# Patient Record
Sex: MALE | Race: WHITE | Employment: FULL TIME | URBAN - METROPOLITAN AREA
[De-identification: names, ages, dates, MRNs, and addresses within clinical notes are randomized per-mention and may not be internally consistent; named-entity substitution may affect disease eponyms.]

---

## 2017-01-17 ENCOUNTER — OFFICE VISIT (OUTPATIENT)
Dept: FAMILY MEDICINE CLINIC | Facility: CLINIC | Age: 52
End: 2017-01-17

## 2017-01-17 VITALS
DIASTOLIC BLOOD PRESSURE: 86 MMHG | TEMPERATURE: 98 F | HEART RATE: 69 BPM | WEIGHT: 171 LBS | BODY MASS INDEX: 26 KG/M2 | SYSTOLIC BLOOD PRESSURE: 133 MMHG

## 2017-01-17 DIAGNOSIS — J06.9 ACUTE URI: Primary | ICD-10-CM

## 2017-01-17 PROCEDURE — 99213 OFFICE O/P EST LOW 20 MIN: CPT | Performed by: FAMILY MEDICINE

## 2017-01-17 PROCEDURE — 99212 OFFICE O/P EST SF 10 MIN: CPT | Performed by: FAMILY MEDICINE

## 2017-01-17 RX ORDER — AZITHROMYCIN 250 MG/1
TABLET, FILM COATED ORAL
Qty: 6 TABLET | Refills: 0 | Status: SHIPPED | OUTPATIENT
Start: 2017-01-17 | End: 2017-01-17

## 2017-01-17 RX ORDER — AZITHROMYCIN 250 MG/1
TABLET, FILM COATED ORAL
Qty: 6 TABLET | Refills: 0 | Status: SHIPPED | OUTPATIENT
Start: 2017-01-17 | End: 2017-01-24

## 2017-01-18 NOTE — PROGRESS NOTES
HPI:    Patient ID: Rimma Khanna is a 49year old male. HPI  Patient presents with:  Cough: c/o cought and congestion    Review of Systems   Constitutional: Positive for fatigue. Negative for fever. HENT: Positive for congestion.     Respiratory: Posit

## 2017-01-24 ENCOUNTER — OFFICE VISIT (OUTPATIENT)
Dept: FAMILY MEDICINE CLINIC | Facility: CLINIC | Age: 52
End: 2017-01-24

## 2017-01-24 VITALS
DIASTOLIC BLOOD PRESSURE: 81 MMHG | TEMPERATURE: 98 F | HEART RATE: 60 BPM | SYSTOLIC BLOOD PRESSURE: 120 MMHG | WEIGHT: 171 LBS | BODY MASS INDEX: 26 KG/M2

## 2017-01-24 DIAGNOSIS — J18.9 ATYPICAL PNEUMONIA: Primary | ICD-10-CM

## 2017-01-24 PROCEDURE — 99212 OFFICE O/P EST SF 10 MIN: CPT | Performed by: FAMILY MEDICINE

## 2017-01-24 PROCEDURE — 99213 OFFICE O/P EST LOW 20 MIN: CPT | Performed by: FAMILY MEDICINE

## 2017-01-25 NOTE — PROGRESS NOTES
HPI:    Patient ID: Carol Otoole is a 49year old male. HPI  Patient presents with:  Pneumonia: f/u  feels much better. Review of Systems   Constitutional: Negative. HENT: Negative. Respiratory: Positive for cough.  Negative for shortness of anthony

## 2017-12-15 ENCOUNTER — APPOINTMENT (OUTPATIENT)
Dept: GENERAL RADIOLOGY | Facility: HOSPITAL | Age: 52
End: 2017-12-15
Attending: EMERGENCY MEDICINE
Payer: COMMERCIAL

## 2017-12-15 ENCOUNTER — HOSPITAL ENCOUNTER (EMERGENCY)
Facility: HOSPITAL | Age: 52
Discharge: HOME | End: 2017-12-15
Attending: EMERGENCY MEDICINE
Payer: COMMERCIAL

## 2017-12-15 VITALS
HEIGHT: 68 IN | BODY MASS INDEX: 27.28 KG/M2 | RESPIRATION RATE: 18 BRPM | SYSTOLIC BLOOD PRESSURE: 136 MMHG | HEART RATE: 62 BPM | OXYGEN SATURATION: 99 % | TEMPERATURE: 97 F | DIASTOLIC BLOOD PRESSURE: 89 MMHG | WEIGHT: 180 LBS

## 2017-12-15 DIAGNOSIS — R07.89 CHEST PAIN, ATYPICAL: Primary | ICD-10-CM

## 2017-12-15 PROCEDURE — 85025 COMPLETE CBC W/AUTO DIFF WBC: CPT | Performed by: EMERGENCY MEDICINE

## 2017-12-15 PROCEDURE — 93005 ELECTROCARDIOGRAM TRACING: CPT

## 2017-12-15 PROCEDURE — 83735 ASSAY OF MAGNESIUM: CPT | Performed by: EMERGENCY MEDICINE

## 2017-12-15 PROCEDURE — 36415 COLL VENOUS BLD VENIPUNCTURE: CPT

## 2017-12-15 PROCEDURE — 80048 BASIC METABOLIC PNL TOTAL CA: CPT | Performed by: EMERGENCY MEDICINE

## 2017-12-15 PROCEDURE — 99285 EMERGENCY DEPT VISIT HI MDM: CPT

## 2017-12-15 PROCEDURE — 93010 ELECTROCARDIOGRAM REPORT: CPT | Performed by: EMERGENCY MEDICINE

## 2017-12-15 PROCEDURE — 84484 ASSAY OF TROPONIN QUANT: CPT | Performed by: EMERGENCY MEDICINE

## 2017-12-15 PROCEDURE — 71020 XR CHEST PA + LAT CHEST (CPT=71020): CPT | Performed by: EMERGENCY MEDICINE

## 2017-12-15 PROCEDURE — 85379 FIBRIN DEGRADATION QUANT: CPT | Performed by: EMERGENCY MEDICINE

## 2017-12-16 NOTE — ED PROVIDER NOTES
Patient Seen in: [facility] AND [facility] Emergency Department    History   Patient presents with:  Chest Pain Angina (cardiovascular)    Stated Complaint: chest pressure     HPI    48 yo M without PMH presenting with two days of anterior chest \"achiness\" witho are as noted in HPI. Constitutional and vital signs reviewed. All other systems reviewed and negative except as noted above. PSFH elements reviewed from today and agreed except as otherwise stated in HPI.     Physical Exam   ED Triage Vitals [12/15 (kpm=51869)    Result Date: 12/15/2017  PROCEDURE: XR CHEST PA + LAT CHEST (CPT=71020)  COMPAR[facility]: [facility]. [facility], X CHEST PA LAT ROUTINE, 5/07/2009, 16:08. INDICATIONS: Atypical chest pain. TECHNIQUE:   Two views.    FINDINGS: CARDI scheduling of outpatient stress test.      Medications Prescribed:  There are no discharge medications for this patient.

## 2017-12-16 NOTE — ED NOTES
Patient complaining of substernal chest pain that radiates up to his neck, and around to his back for the last two days. States nothing makes it feel better or worse, but has felt an ache in both lower arms starting today.  Denies any SOB

## 2017-12-18 ENCOUNTER — OFFICE VISIT (OUTPATIENT)
Dept: FAMILY MEDICINE CLINIC | Facility: CLINIC | Age: 52
End: 2017-12-18

## 2017-12-18 VITALS
DIASTOLIC BLOOD PRESSURE: 85 MMHG | HEART RATE: 67 BPM | HEIGHT: 68 IN | BODY MASS INDEX: 28.04 KG/M2 | WEIGHT: 185 LBS | SYSTOLIC BLOOD PRESSURE: 126 MMHG

## 2017-12-18 DIAGNOSIS — R07.89 OTHER CHEST PAIN: Primary | ICD-10-CM

## 2017-12-18 PROCEDURE — 99212 OFFICE O/P EST SF 10 MIN: CPT | Performed by: FAMILY MEDICINE

## 2017-12-18 PROCEDURE — 99214 OFFICE O/P EST MOD 30 MIN: CPT | Performed by: FAMILY MEDICINE

## 2017-12-18 NOTE — PROGRESS NOTES
HPI:    Patient ID: Paul Crowe is a 47year old male. HPI  Patient presents with:  ER F/U  Chest Pain: still feels some pressure in chest.   Chest pain work up negative. Review of Systems   Constitutional: Negative. Respiratory: Negative.     Ca

## 2019-04-01 ENCOUNTER — OFFICE VISIT (OUTPATIENT)
Dept: FAMILY MEDICINE CLINIC | Facility: CLINIC | Age: 54
End: 2019-04-01
Payer: COMMERCIAL

## 2019-04-01 VITALS
BODY MASS INDEX: 27.29 KG/M2 | TEMPERATURE: 98 F | SYSTOLIC BLOOD PRESSURE: 130 MMHG | WEIGHT: 178 LBS | HEIGHT: 67.6 IN | DIASTOLIC BLOOD PRESSURE: 88 MMHG | HEART RATE: 66 BPM

## 2019-04-01 DIAGNOSIS — K40.90 LEFT INGUINAL HERNIA: ICD-10-CM

## 2019-04-01 DIAGNOSIS — Z00.00 ROUTINE GENERAL MEDICAL EXAMINATION AT A HEALTH CARE FACILITY: Primary | ICD-10-CM

## 2019-04-01 DIAGNOSIS — Z85.89 HISTORY OF SQUAMOUS CELL CARCINOMA: ICD-10-CM

## 2019-04-01 DIAGNOSIS — R19.4 BOWEL HABIT CHANGES: ICD-10-CM

## 2019-04-01 PROCEDURE — 99396 PREV VISIT EST AGE 40-64: CPT | Performed by: FAMILY MEDICINE

## 2019-04-01 NOTE — PROGRESS NOTES
HPI:    Patient ID: Luna Torres is a 47year old male.     HPI  Patient presents with:  Routine Physical  Gastro-esophageal Reflux    Past Medical History:   Diagnosis Date   • Chalazion of right eye 2011    per [employer]: RUL and RLL / OD 2011   • Squamous Breath sounds normal.   Abdominal: Normal appearance. There is no hepatosplenomegaly. There is no tenderness. There is no CVA tenderness. A hernia is present. Hernia confirmed positive in the left inguinal area.    Genitourinary: Prostate normal.   Lymphade

## 2019-05-03 ENCOUNTER — LAB ENCOUNTER (OUTPATIENT)
Dept: LAB | Age: 54
End: 2019-05-03
Attending: FAMILY MEDICINE
Payer: COMMERCIAL

## 2019-05-03 DIAGNOSIS — Z00.00 ROUTINE GENERAL MEDICAL EXAMINATION AT A HEALTH CARE FACILITY: ICD-10-CM

## 2019-05-03 PROCEDURE — 85025 COMPLETE CBC W/AUTO DIFF WBC: CPT

## 2019-05-03 PROCEDURE — 81003 URINALYSIS AUTO W/O SCOPE: CPT

## 2019-05-03 PROCEDURE — 80053 COMPREHEN METABOLIC PANEL: CPT

## 2019-05-03 PROCEDURE — 36415 COLL VENOUS BLD VENIPUNCTURE: CPT

## 2019-05-03 PROCEDURE — 80061 LIPID PANEL: CPT

## 2019-08-14 ENCOUNTER — OFFICE VISIT (OUTPATIENT)
Dept: SURGERY | Facility: CLINIC | Age: 54
End: 2019-08-14
Payer: COMMERCIAL

## 2019-08-14 VITALS — BODY MASS INDEX: 27.28 KG/M2 | HEIGHT: 68 IN | WEIGHT: 180 LBS

## 2019-08-14 DIAGNOSIS — K40.90 NON-RECURRENT UNILATERAL INGUINAL HERNIA WITHOUT OBSTRUCTION OR GANGRENE: Primary | ICD-10-CM

## 2019-08-14 PROCEDURE — 99244 OFF/OP CNSLTJ NEW/EST MOD 40: CPT | Performed by: SURGERY

## 2019-08-15 NOTE — H&P
History and Physical      Alex Galarza is a 49year old male. HPI   Patient presents with:  Hernia: Pt was referred by Dr. Mindy Rebolledo for left inguinal hernia. Pt states has had this for a while, discomfort is getting more frequent.  Has discomfort after noted  Head/Face: normocephalic  Nose/Mouth/Throat: nose and throat are clear palate is intact mucous membranes are moist no oral lesions are noted  Neck/Thyroid: neck is supple without adenopathy  Respiratory: normal to inspection lungs are clear to auscu

## 2019-08-20 ENCOUNTER — NURSE ONLY (OUTPATIENT)
Dept: GASTROENTEROLOGY | Facility: CLINIC | Age: 54
End: 2019-08-20

## 2019-08-20 NOTE — PROGRESS NOTES
Pt states he has had irregular bowel habits for the past year. Vacillates between constipation and then watery diarrhea.  Appt made for consulation    Date, time and location verified    Future Appointments   Date Time Provider Socorro Guzmán   9/11/2019

## 2019-08-22 ENCOUNTER — NURSE TRIAGE (OUTPATIENT)
Dept: FAMILY MEDICINE CLINIC | Facility: CLINIC | Age: 54
End: 2019-08-22

## 2019-08-22 ENCOUNTER — TELEPHONE (OUTPATIENT)
Dept: OPHTHALMOLOGY | Facility: CLINIC | Age: 54
End: 2019-08-22

## 2019-08-22 NOTE — TELEPHONE ENCOUNTER
Action Requested: Summary for Provider     []  Critical Lab, Recommendations Needed  [] Need Additional Advice  []   FYI    []   Need Orders  [] Need Medications Sent to Pharmacy  []  Other     SUMMARY: Transferred to [facility] to [facility] x 35614 per Triage Pro

## 2019-08-27 ENCOUNTER — OFFICE VISIT (OUTPATIENT)
Dept: OPHTHALMOLOGY | Facility: CLINIC | Age: 54
End: 2019-08-27
Payer: COMMERCIAL

## 2019-08-27 DIAGNOSIS — H52.13 MYOPIA OF BOTH EYES WITH ASTIGMATISM AND PRESBYOPIA: ICD-10-CM

## 2019-08-27 DIAGNOSIS — H52.4 MYOPIA OF BOTH EYES WITH ASTIGMATISM AND PRESBYOPIA: ICD-10-CM

## 2019-08-27 DIAGNOSIS — H00.11 CHALAZION RIGHT UPPER EYELID: Primary | ICD-10-CM

## 2019-08-27 DIAGNOSIS — H52.203 MYOPIA OF BOTH EYES WITH ASTIGMATISM AND PRESBYOPIA: ICD-10-CM

## 2019-08-27 PROCEDURE — 92015 DETERMINE REFRACTIVE STATE: CPT | Performed by: OPHTHALMOLOGY

## 2019-08-27 PROCEDURE — 99203 OFFICE O/P NEW LOW 30 MIN: CPT | Performed by: OPHTHALMOLOGY

## 2019-08-27 NOTE — ASSESSMENT & PLAN NOTE
Recommend aggressive warm compresses; patient should use them 20-30 times per day. Information on chalazia given. Told patient that it is not big enough to excise at this time.   Discussed with patient that since is still in the early stages, it may be a

## 2019-08-27 NOTE — PATIENT INSTRUCTIONS
Chalazion right upper eyelid  Recommend aggressive warm compresses; patient should use them 20-30 times per day. Information on chalazia given. Told patient that it is not big enough to excise at this time.   Discussed with patient that since is still in moist towel or compress for 10 to 15 minutes, 3 to 4 times a day. This will reduce the swelling and soften the hardened oils blocking the duct. · Massage the area gently after applying the warm compress to help drain the chalazion.  Or follow your healthca instructions. Chalazion    A chalazion is a blocked, swollen oil gland in the eyelid. The eyelids have oil glands that lubricate the inside of the lids. If a gland becomes blocked, the oil builds up and causes the skin to swell.   A chalazion can shoaib or a moist eyelid cleansing wipe. This is to help reduce clogging of the duct, as well as help prevent a chalazion from returning. Ask your healthcare provider about products to clean your eyelids.   Follow-up care  Follow up with your healthcare provider,

## 2019-08-27 NOTE — PROGRESS NOTES
Andrew Tomlinson is a 49year old male. HPI:     HPI     NP/ pt complains of itching in his right eye x 6 days. Pt states that when he woke up on 8/22/19 his right upper lid was swollen.  Pt called on 8/22/19 and tech advised pt to use warm compresses and to Acuity (Snellen - Linear)       Right Left    Dist cc 20/20 -2 20/20    Near cc 20/20 20/20    Correction:  Glasses          Pupils       Pupils    Right PERRL    Left PERRL            Slit Lamp and Fundus Exam     Slit Lamp Exam       Right Left    Lids/L this encounter        Follow up instructions:  Return in about 7 weeks (around 10/12/2019) for Complete eye exam.    8/27/2019  Scribed by: Octavio Marti MD

## 2019-09-20 ENCOUNTER — TELEPHONE (OUTPATIENT)
Dept: SURGERY | Facility: CLINIC | Age: 54
End: 2019-09-20

## 2019-09-20 NOTE — TELEPHONE ENCOUNTER
Pt states he received a call from the sx center and was told to call the office to give an update on his condition from sx yesterday. Pt state he is doing fine overall.

## 2019-09-23 ENCOUNTER — TELEPHONE (OUTPATIENT)
Dept: SURGERY | Facility: CLINIC | Age: 54
End: 2019-09-23

## 2019-09-23 NOTE — TELEPHONE ENCOUNTER
Pt called stating pt had surgery 9-19-19. Pt requesting a return to work note. Returning on weds. 9-25-19. Post note on [employer]. Call to advise            Called pt back @ 11:50 today.   I have discussed his case again with medical and radiation oncolog

## 2019-10-12 ENCOUNTER — TELEPHONE (OUTPATIENT)
Dept: OPHTHALMOLOGY | Facility: CLINIC | Age: 54
End: 2019-10-12

## 2019-10-15 ENCOUNTER — OFFICE VISIT (OUTPATIENT)
Dept: GASTROENTEROLOGY | Facility: CLINIC | Age: 54
End: 2019-10-15
Payer: COMMERCIAL

## 2019-10-15 ENCOUNTER — TELEPHONE (OUTPATIENT)
Dept: GASTROENTEROLOGY | Facility: CLINIC | Age: 54
End: 2019-10-15

## 2019-10-15 VITALS
HEART RATE: 73 BPM | SYSTOLIC BLOOD PRESSURE: 123 MMHG | BODY MASS INDEX: 28.08 KG/M2 | DIASTOLIC BLOOD PRESSURE: 77 MMHG | WEIGHT: 181 LBS | HEIGHT: 67.5 IN

## 2019-10-15 DIAGNOSIS — R19.4 ALTERED BOWEL HABITS: ICD-10-CM

## 2019-10-15 DIAGNOSIS — Z12.11 ENCOUNTER FOR SCREENING COLONOSCOPY: Primary | ICD-10-CM

## 2019-10-15 PROCEDURE — 99244 OFF/OP CNSLTJ NEW/EST MOD 40: CPT | Performed by: PHYSICIAN ASSISTANT

## 2019-10-15 RX ORDER — POLYETHYLENE GLYCOL 3350, SODIUM CHLORIDE, SODIUM BICARBONATE, POTASSIUM CHLORIDE 420; 11.2; 5.72; 1.48 G/4L; G/4L; G/4L; G/4L
POWDER, FOR SOLUTION ORAL
Qty: 1 BOTTLE | Refills: 0 | Status: SHIPPED | OUTPATIENT
Start: 2019-10-15 | End: 2019-11-25 | Stop reason: ALTCHOICE

## 2019-10-15 RX ORDER — ZINC SULFATE 50(220)MG
220 CAPSULE ORAL AS NEEDED
Status: ON HOLD | COMMUNITY
End: 2019-12-13

## 2019-10-15 NOTE — PATIENT INSTRUCTIONS
1. Schedule colonoscopy w/ Dr. Amparo Menjivar or Lakisha Martell with IV or MAC sedation @ [address] [place] [address], [facility] or [place]    2.  bowel prep from pharmacy - I have prescribed Trilyte split dose preparation    3.  Medication Changes:    ** If MAC @ [facility]/[place]:    - HOLD ACE/ARBs

## 2019-10-15 NOTE — H&P
[address] [facility] [address] Gastroenterology                                                                                                  Clinic History and Physical     Pa Occupation: HR for long-term    History, Medications, Allergies, ROS:      Past Medical History:   Diagnosis Date   • Chalazion of right eye 2011    RUL and RLL, OD   • Chalazion right upper eyelid 8/27/2019   • Squamous cell carcinoma of skin 2014    rig comfortable and in no acute discomfort  HEENT: conjunctiva pink, the sclera appears anicteric, OP clear, MMM  CV: regular rate and rhythm  Lung: moves air well; no labored breathing  Abdomen: soft, NTND abdomen with +NABS appreciated, no hepatosplenomegaly continue all medications as prescribed  - see above    Colonoscopy consent: I have discussed the risks, benefits, and alternatives to colonoscopy with the patient [who demonstrated understanding], including but not limited to the risks of delayed diagnosis

## 2019-10-15 NOTE — TELEPHONE ENCOUNTER
Scheduled for:  Colonoscopy 79390  Provider Name: Dr. Francheska Hernandez  Date:  11/14/19  Location:  [facility]  Sedation:  MAC  Time:   1100 (pt is aware that [facility] will call the day before to confirm arrival time)    Prep:  Trilyte  Meds/Allergies Reconciled?:  Physician re

## 2019-11-14 ENCOUNTER — TELEPHONE (OUTPATIENT)
Dept: SURGERY | Age: 54
End: 2019-11-14

## 2019-11-14 ENCOUNTER — LAB REQUISITION (OUTPATIENT)
Dept: LAB | Facility: HOSPITAL | Age: 54
End: 2019-11-14
Payer: COMMERCIAL

## 2019-11-14 DIAGNOSIS — Z01.89 ENCOUNTER FOR OTHER SPECIFIED SPECIAL EXAMINATIONS: ICD-10-CM

## 2019-11-14 PROCEDURE — 88305 TISSUE EXAM BY PATHOLOGIST: CPT | Performed by: INTERNAL MEDICINE

## 2019-11-14 NOTE — TELEPHONE ENCOUNTER
Kirstin Dickerson --  Not sure if you automatically get [address] reports from this new link we have into [facility] from the outpatient surgery center [facility]).     Large likely malignant polyp encountered and removed from the rectosigmoid colon tod

## 2019-11-14 NOTE — TELEPHONE ENCOUNTER
Thank you so much for doing his colonoscopy, CB. This is unfortunate, but it is great if it has been caught early. Please let me know if you need any other assistance with this patient.     Thank you, Dr. Farhan Molina, for continuing to refer patients to see m

## 2019-11-15 ENCOUNTER — TELEPHONE (OUTPATIENT)
Dept: GASTROENTEROLOGY | Facility: CLINIC | Age: 54
End: 2019-11-15

## 2019-11-15 ENCOUNTER — NURSE NAVIGATOR ENCOUNTER (OUTPATIENT)
Dept: HEMATOLOGY/ONCOLOGY | Facility: HOSPITAL | Age: 54
End: 2019-11-15

## 2019-11-15 DIAGNOSIS — C18.7 MALIGNANT NEOPLASM OF SIGMOID COLON (HCC): Primary | ICD-10-CM

## 2019-11-15 NOTE — TELEPHONE ENCOUNTER
I spoke to the pt and I  Advised him the pathology is not back yet and he should check back on Monday.  He verbalizes understanding

## 2019-11-15 NOTE — TELEPHONE ENCOUNTER
Sent call to RN - Patient requesting to speak with clinical staff regarding CLN results. Please call - very anxious. Thank you.

## 2019-11-15 NOTE — TELEPHONE ENCOUNTER
Dr Ashley Ortiz called me with path results on yesterday's large sigmoid polyp. Multiple fragments contain invasive adenocarcinoma with positive margins.   Complexity of yesterday's resection with on and off bleeding made definitive retrieval and labelling of pi

## 2019-11-15 NOTE — PROGRESS NOTES
TONYA introducing myself as GI Navigator. Left direct number. Encouraged to call me prn. Provided main number to the [facility]. I will be in touch with him early next week to coordinate his care given this recent colon cancer diagnosis.

## 2019-11-18 ENCOUNTER — TELEPHONE (OUTPATIENT)
Dept: HEMATOLOGY/ONCOLOGY | Facility: HOSPITAL | Age: 54
End: 2019-11-18

## 2019-11-18 NOTE — TELEPHONE ENCOUNTER
Medical Oncology consult scheduled for Monday, 11/25/19, with Dr. Jae Garcia. Staging CT 11/20/19. Will coordinate consult Dr. Summer Astudillo based on CT scan. Provided patient direct number. Educated on role of GI Navigator.   Encouraged to jesus prn moving forward

## 2019-11-19 ENCOUNTER — TELEPHONE (OUTPATIENT)
Dept: GASTROENTEROLOGY | Facility: CLINIC | Age: 54
End: 2019-11-19

## 2019-11-19 NOTE — TELEPHONE ENCOUNTER
I called Uziel Wilde @ [phone]. CPT codes 61822/34533 do not require a PA.  Confirmation #:[id number]

## 2019-11-19 NOTE — TELEPHONE ENCOUNTER
Hanny,    Is there anyway you can check on this referral for the CT of the abdomen & pelvis.  Pt is scheduled for tomorrow

## 2019-11-20 ENCOUNTER — HOSPITAL ENCOUNTER (OUTPATIENT)
Dept: CT IMAGING | Facility: HOSPITAL | Age: 54
Discharge: HOME | End: 2019-11-20
Attending: INTERNAL MEDICINE
Payer: COMMERCIAL

## 2019-11-20 ENCOUNTER — TELEPHONE (OUTPATIENT)
Dept: HEMATOLOGY/ONCOLOGY | Facility: HOSPITAL | Age: 54
End: 2019-11-20

## 2019-11-20 DIAGNOSIS — C18.7 MALIGNANT NEOPLASM OF SIGMOID COLON (HCC): ICD-10-CM

## 2019-11-20 PROCEDURE — 82565 ASSAY OF CREATININE: CPT

## 2019-11-20 PROCEDURE — 74177 CT ABD & PELVIS W/CONTRAST: CPT | Performed by: INTERNAL MEDICINE

## 2019-11-20 PROCEDURE — 71260 CT THORAX DX C+: CPT | Performed by: INTERNAL MEDICINE

## 2019-11-20 NOTE — TELEPHONE ENCOUNTER
Pt called asking if his CT scheduled for today would have results ready immediately following his scan and who would give them to him.  I explained results would not be immediate; the radiologist who reads the scan would need to dictate his results and jorge

## 2019-11-22 ENCOUNTER — TELEPHONE (OUTPATIENT)
Dept: GASTROENTEROLOGY | Facility: CLINIC | Age: 54
End: 2019-11-22

## 2019-11-22 NOTE — TELEPHONE ENCOUNTER
Patient calling for results from CT completed 11/20/19, patient informed  not in clinic today. Patient has concerns, he was told he would receive a call Thursday or Friday. Please call at:[phone],thanks.   *patient had concerns regarding polys

## 2019-11-22 NOTE — TELEPHONE ENCOUNTER
OFFICE/CLINIC ON-CALL:    Dr. Shashi Ayala out of town so I  discussed CT results with patient and his brother Timothy Luo. New dx of colon cancer. They understand no lesions seen in liver.  Lung lesions are multiple, may be granulomas OR may be metastatic disease which

## 2019-11-25 ENCOUNTER — TELEPHONE (OUTPATIENT)
Dept: HEMATOLOGY/ONCOLOGY | Facility: HOSPITAL | Age: 54
End: 2019-11-25

## 2019-11-25 ENCOUNTER — TELEPHONE (OUTPATIENT)
Dept: SURGERY | Facility: CLINIC | Age: 54
End: 2019-11-25

## 2019-11-25 ENCOUNTER — OFFICE VISIT (OUTPATIENT)
Dept: HEMATOLOGY/ONCOLOGY | Facility: HOSPITAL | Age: 54
End: 2019-11-25
Attending: INTERNAL MEDICINE
Payer: COMMERCIAL

## 2019-11-25 VITALS
HEART RATE: 70 BPM | HEIGHT: 67.5 IN | TEMPERATURE: 99 F | BODY MASS INDEX: 28.08 KG/M2 | WEIGHT: 181 LBS | SYSTOLIC BLOOD PRESSURE: 130 MMHG | OXYGEN SATURATION: 97 % | DIASTOLIC BLOOD PRESSURE: 88 MMHG

## 2019-11-25 DIAGNOSIS — C18.7 MALIGNANT NEOPLASM OF SIGMOID COLON (HCC): Primary | ICD-10-CM

## 2019-11-25 DIAGNOSIS — C18.7 CANCER OF SIGMOID COLON (HCC): Primary | ICD-10-CM

## 2019-11-25 PROCEDURE — 99245 OFF/OP CONSLTJ NEW/EST HI 55: CPT | Performed by: INTERNAL MEDICINE

## 2019-11-25 NOTE — PROGRESS NOTES
HPI     Alex Galarza is a 49year old male who is here today for evaluation of newly diagnosed Malignant neoplasm of sigmoid colon (hcc)  (primary encounter diagnosis). Patient was diagnosed on November 14, 2019.     Initial symptoms:    Altered bowel dizziness and headaches. Hematological: Negative for adenopathy. Does not bruise/bleed easily. Psychiatric/Behavioral: Negative for sleep disturbance.          Current Outpatient Medications   Medication Sig Dispense Refill   • zinc sulfate (ZINC-220) 2 Intimate partner violence:        Fear of current or ex partner: Not on file        Emotionally abused: Not on file        Physically abused: Not on file        Forced sexual activity: Not on file    Other Topics      Concerns:         Service: No ASSESSMENT/PLAN:   Malignant neoplasm of sigmoid colon (hcc)  (primary encounter diagnosis)    Cancer Staging  Malignant neoplasm of sigmoid colon ([facility] [address]  Staging form: Colon and Rectum, AJCC 8th Edition  - Clinical: Stage Unknown (cTX, cN0, cM0) - Signed surgical resection with Dr. Kym Naylor, and then to have follow-up with me 1 week postop to discuss the patient's pathologic staging, as well as whether there is benefit from adjuvant chemotherapy, and if there is benefit we will discuss adjuvant therapy at th there was still a remaining piece of polypoid tissue with an oozing cut edge, we trimmed away the rest also down the wall of the sigmoid colon using snare cautery. · After the 2 clips had been placed, there was no further bleeding.   It was difficult to as granulomas. No pleural effusion or pneumothorax. VASCULATURE:            No central pulmonary embolus. No pulmonary artery enlargement. No aortic aneurysm or acute aortic injury. CHEST WALL:  No axillary or clavicular lymphadenopathy.     BONES: wall thickening, lesion, or calculus. PELVIC NODES:            No adenopathy. PELVIC ORGANS:         No visible mass. Pelvic organs appropriate for patient age.        Abdomen and pelvis:  BONES:             Mild degenerative endplate change within t Case: [id number]                                   Authorizing Provider:  Cheryle Queen,  Collected:           11/14/2019 09:50 AM                                  MD Pepper Barreto Granulocyte Absolute      0.00 - 1.00 x10(3) uL 0.01   Neutrophils %      % 67.0   Lymphocytes %      % 19.1   Monocytes %      % 9.3   Eosinophils %      % 3.3   Basophils %      % 1.1   Immature Granulocyte %      % 0.2   Glucose      70 - 99 mg/dL 86

## 2019-11-25 NOTE — TELEPHONE ENCOUNTER
I called Mr Rut Gruber to check-in. We discussed recent CT results. He is very satisfied with today's visit with Dr. Marcela Mccrary. He knows Dr. Samson Leaks from previous hernia surgery.   He sounds like he is coping well with the news and the need for colon resection surge

## 2019-11-25 NOTE — TELEPHONE ENCOUNTER
Dr. Sybil Rothman time and assistance greatly appreciated. I called Mr. Hernandezter Abhilash today as per other open telephone encounter.

## 2019-11-25 NOTE — TELEPHONE ENCOUNTER
Mynor Mack was calling back to tell you that he got his consult appointment with Dr. Edi Zafar on 11/27/19.

## 2019-11-25 NOTE — TELEPHONE ENCOUNTER
Informed patient that Dr. Andrea Tobar office will be calling to coordinate consult with Dr. Pernell Short. Encouraged to call me by the end of this week if appt is not set up.

## 2019-11-25 NOTE — TELEPHONE ENCOUNTER
Left voicemail that CEA nl. Also, that per GI tumor board recommend MRI to evaluate location as low sigmoid and close to rectum. Will have RN call to schedule the MRI.

## 2019-11-26 ENCOUNTER — TELEPHONE (OUTPATIENT)
Dept: HEMATOLOGY/ONCOLOGY | Facility: HOSPITAL | Age: 54
End: 2019-11-26

## 2019-11-26 NOTE — TELEPHONE ENCOUNTER
Pt called and notified that MRI authorized. Pt will call to schedule. Pt has appointment with Dr. Stacy Gottron tomorrow.

## 2019-11-27 ENCOUNTER — OFFICE VISIT (OUTPATIENT)
Dept: SURGERY | Facility: CLINIC | Age: 54
End: 2019-11-27
Payer: COMMERCIAL

## 2019-11-27 ENCOUNTER — TELEPHONE (OUTPATIENT)
Dept: ADMINISTRATIVE | Age: 54
End: 2019-11-27

## 2019-11-27 VITALS — WEIGHT: 181 LBS | BODY MASS INDEX: 28.08 KG/M2 | HEIGHT: 67.5 IN

## 2019-11-27 DIAGNOSIS — C18.7 MALIGNANT NEOPLASM OF SIGMOID COLON (HCC): Primary | ICD-10-CM

## 2019-11-27 PROCEDURE — 99214 OFFICE O/P EST MOD 30 MIN: CPT | Performed by: SURGERY

## 2019-11-27 RX ORDER — NEOMYCIN SULFATE 500 MG/1
TABLET ORAL
Qty: 6 TABLET | Refills: 0 | Status: ON HOLD | OUTPATIENT
Start: 2019-11-27 | End: 2019-12-13

## 2019-11-27 RX ORDER — METRONIDAZOLE 500 MG/1
TABLET ORAL
Qty: 6 TABLET | Refills: 0 | Status: ON HOLD | OUTPATIENT
Start: 2019-11-27 | End: 2019-12-13

## 2019-11-27 NOTE — H&P (VIEW-ONLY)
History and Physical      Carlota Lott is a 49year old male. HPI   Patient presents with:  Cancer: Pt referred by Dr. Gabriel Arthur regarding colon cancer. Pt states he was overdue for 1st colonoscopy.   Pt states he noticed changes in bm's (diarrhea/constipat week      Drug use: No    Family History   Problem Relation Age of Onset   • Prostate Cancer Father    • Hypertension Father    • Stroke Father    • Heart Disease Father    • Breast Cancer Mother    • Diabetes Neg    • Glaucoma Neg    • Macular degeneratio cut this polyp out in several large pieces. All cuts today were made using snare cautery current. · One of the final cuts down against the wall of the rectosigmoid colon resulted in rapid arterial bleeding. Photographs taken.   This was controlled by angelique spleen. While the noncalcified pulmonary nodules may represent very early/small metastasis, they more likely represent noncalcified granulomas given the presence of other calcified granulomas.   However, recommend short-term follow-up CT chest in 3 months

## 2019-11-27 NOTE — H&P
History and Physical      Elio Scott is a 49year old male. HPI   Patient presents with:  Cancer: Pt referred by Dr. Rut Coto regarding colon cancer. Pt states he was overdue for 1st colonoscopy.   Pt states he noticed changes in bm's (diarrhea/constipat week      Drug use: No    Family History   Problem Relation Age of Onset   • Prostate Cancer Father    • Hypertension Father    • Stroke Father    • Heart Disease Father    • Breast Cancer Mother    • Diabetes Neg    • Glaucoma Neg    • Macular degeneratio cut this polyp out in several large pieces. All cuts today were made using snare cautery current. · One of the final cuts down against the wall of the rectosigmoid colon resulted in rapid arterial bleeding. Photographs taken.   This was controlled by angelique spleen. While the noncalcified pulmonary nodules may represent very early/small metastasis, they more likely represent noncalcified granulomas given the presence of other calcified granulomas.   However, recommend short-term follow-up CT chest in 3 months

## 2019-11-27 NOTE — H&P (VIEW-ONLY)
History and Physical      Karolina Saenz is a 49year old male. HPI   Patient presents with:  Cancer: Pt referred by Dr. Frankie Purdy regarding colon cancer. Pt states he was overdue for 1st colonoscopy.   Pt states he noticed changes in bm's (diarrhea/constipat week      Drug use: No    Family History   Problem Relation Age of Onset   • Prostate Cancer Father    • Hypertension Father    • Stroke Father    • Heart Disease Father    • Breast Cancer Mother    • Diabetes Neg    • Glaucoma Neg    • Macular degeneratio cut this polyp out in several large pieces. All cuts today were made using snare cautery current. · One of the final cuts down against the wall of the rectosigmoid colon resulted in rapid arterial bleeding. Photographs taken.   This was controlled by angelique spleen. While the noncalcified pulmonary nodules may represent very early/small metastasis, they more likely represent noncalcified granulomas given the presence of other calcified granulomas.   However, recommend short-term follow-up CT chest in 3 months

## 2019-11-27 NOTE — TELEPHONE ENCOUNTER
Pt will be dropping off FMLA or disab forms. He is aware of the HIPAA and fee. 1st day off will be surgery date that will be discussed at today's visit.     When form is completed, he would like them faxed and then attached to his Mychart

## 2019-11-27 NOTE — TELEPHONE ENCOUNTER
Pt dropped off FMLA  Papers JAJA was signed and $25 was paid. Sent to Forms and placed in JAJA @ [facility].

## 2019-11-29 DIAGNOSIS — C18.7 CANCER OF SIGMOID COLON (HCC): Primary | ICD-10-CM

## 2019-12-03 ENCOUNTER — HOSPITAL ENCOUNTER (OUTPATIENT)
Dept: MRI IMAGING | Facility: HOSPITAL | Age: 54
Discharge: HOME | End: 2019-12-03
Attending: INTERNAL MEDICINE
Payer: COMMERCIAL

## 2019-12-03 ENCOUNTER — LAB ENCOUNTER (OUTPATIENT)
Dept: LAB | Facility: HOSPITAL | Age: 54
End: 2019-12-03
Attending: INTERNAL MEDICINE
Payer: COMMERCIAL

## 2019-12-03 DIAGNOSIS — C18.7 CANCER OF SIGMOID COLON (HCC): ICD-10-CM

## 2019-12-03 DIAGNOSIS — Z01.818 PREOPERATIVE TESTING: ICD-10-CM

## 2019-12-03 PROCEDURE — 72197 MRI PELVIS W/O & W/DYE: CPT | Performed by: INTERNAL MEDICINE

## 2019-12-03 PROCEDURE — A9575 INJ GADOTERATE MEGLUMI 0.1ML: HCPCS | Performed by: INTERNAL MEDICINE

## 2019-12-03 PROCEDURE — 86901 BLOOD TYPING SEROLOGIC RH(D): CPT

## 2019-12-03 PROCEDURE — 36415 COLL VENOUS BLD VENIPUNCTURE: CPT

## 2019-12-03 PROCEDURE — 86900 BLOOD TYPING SEROLOGIC ABO: CPT

## 2019-12-03 PROCEDURE — 86850 RBC ANTIBODY SCREEN: CPT

## 2019-12-04 ENCOUNTER — TELEPHONE (OUTPATIENT)
Dept: SURGERY | Facility: CLINIC | Age: 54
End: 2019-12-04

## 2019-12-04 DIAGNOSIS — C20 RECTAL CANCER (HCC): Primary | ICD-10-CM

## 2019-12-04 NOTE — TELEPHONE ENCOUNTER
Pt contacted instructed to do fleets enema tonight and tomorrow morning. per DR. Orozco Pt verbalized understanding and has no question @ current time.

## 2019-12-04 NOTE — TELEPHONE ENCOUNTER
Dr. Alisa Brooks    Please sign off on form:  -Highlight the patient and hit \"Chart\" button.   -In Chart Review, w/in the Encounter tab - click 1 time on the Telephone call encounter for 11/27/19 Scroll down the telephone encounter.  -Click \"scan on\" blue Hyp

## 2019-12-04 NOTE — TELEPHONE ENCOUNTER
MRI results noted. MR and CT images reviewed. Discussed with Dr. Lainey Yu and Dr. Summer Kwok. Endoscopic and CT impression is that of a sigmoid colon cancer, but unable to clarify this anatomic issue on MR. Reviewed results and issues and options with pt.   Will

## 2019-12-05 ENCOUNTER — HOSPITAL ENCOUNTER (OUTPATIENT)
Facility: HOSPITAL | Age: 54
Setting detail: HOSPITAL OUTPATIENT SURGERY
Discharge: HOME | End: 2019-12-05
Attending: SURGERY | Admitting: SURGERY
Payer: COMMERCIAL

## 2019-12-05 VITALS
BODY MASS INDEX: 27.28 KG/M2 | DIASTOLIC BLOOD PRESSURE: 88 MMHG | HEART RATE: 66 BPM | HEIGHT: 68 IN | SYSTOLIC BLOOD PRESSURE: 139 MMHG | WEIGHT: 180 LBS | OXYGEN SATURATION: 100 %

## 2019-12-05 DIAGNOSIS — C20 RECTAL CANCER (HCC): ICD-10-CM

## 2019-12-05 PROCEDURE — 45300 PROCTOSIGMOIDOSCOPY DX: CPT | Performed by: SURGERY

## 2019-12-05 PROCEDURE — 0DJD8ZZ INSPECTION OF LOWER INTESTINAL TRACT, VIA NATURAL OR ARTIFICIAL OPENING ENDOSCOPIC: ICD-10-PCS | Performed by: SURGERY

## 2019-12-05 NOTE — OPERATIVE REPORT
[facility]    PATIENT'S NAME: Aida Sloan   ATTENDING PHYSICIAN: Deidre Momin MD   OPERATING PHYSICIAN: Deidre Momin MD   PATIENT ACCOUNT#:   [id number]    LOCATION:  [place] [address]  MEDICAL RECORD #:   [id number]       DATE OF BI

## 2019-12-05 NOTE — INTERVAL H&P NOTE
Pre-op Diagnosis: Rectal cancer ([facility] [address] [C20]    The above referenced H&P was reviewed by Debbie Hughes MD on 12/5/2019, the patient was examined and no significant changes have occurred in the patient's condition since the H&P was performed.   I discussed wi

## 2019-12-05 NOTE — BRIEF OP NOTE
Pre-Operative Diagnosis: Rectal cancer ([facility] [address] [C20]     Post-Operative Diagnosis: Rectal cancer ([facility] [address] [C20]      Procedure Performed:   Procedure(s):  Diagnostic proctoscopy    Surgeon(s) and Role:     Yessica Fink MD - Primary    Assistant(s):        S

## 2019-12-06 ENCOUNTER — TELEPHONE (OUTPATIENT)
Dept: SURGERY | Facility: CLINIC | Age: 54
End: 2019-12-06

## 2019-12-06 NOTE — TELEPHONE ENCOUNTER
Called pt back @ 11:50 today. I have discussed his case again with medical and radiation oncology. Rec proceed with low anterior resection now, and discuss adjuvant tx options thereafter, after pathologic and clinical (operativea) staging is complete.  He

## 2019-12-10 ENCOUNTER — ANESTHESIA (OUTPATIENT)
Dept: SURGERY | Facility: HOSPITAL | Age: 54
DRG: 330 | End: 2019-12-10
Payer: COMMERCIAL

## 2019-12-10 ENCOUNTER — ANESTHESIA EVENT (OUTPATIENT)
Dept: SURGERY | Facility: HOSPITAL | Age: 54
DRG: 330 | End: 2019-12-10
Payer: COMMERCIAL

## 2019-12-10 ENCOUNTER — HOSPITAL ENCOUNTER (INPATIENT)
Facility: HOSPITAL | Age: 54
LOS: 3 days | Discharge: HOME | DRG: 330 | End: 2019-12-13
Attending: SURGERY | Admitting: SURGERY
Payer: COMMERCIAL

## 2019-12-10 DIAGNOSIS — Z01.818 PREOPERATIVE TESTING: Primary | ICD-10-CM

## 2019-12-10 DIAGNOSIS — C18.7 MALIGNANT NEOPLASM OF SIGMOID COLON (HCC): ICD-10-CM

## 2019-12-10 PROBLEM — C20 RECTAL CANCER (HCC): Status: ACTIVE | Noted: 2019-12-10

## 2019-12-10 PROCEDURE — 44207 L COLECTOMY/COLOPROCTOSTOMY: CPT | Performed by: SURGERY

## 2019-12-10 PROCEDURE — 0DTN4ZZ RESECTION OF SIGMOID COLON, PERCUTANEOUS ENDOSCOPIC APPROACH: ICD-10-PCS | Performed by: SURGERY

## 2019-12-10 PROCEDURE — 0DTP4ZZ RESECTION OF RECTUM, PERCUTANEOUS ENDOSCOPIC APPROACH: ICD-10-PCS | Performed by: SURGERY

## 2019-12-10 PROCEDURE — 99232 SBSQ HOSP IP/OBS MODERATE 35: CPT | Performed by: HOSPITALIST

## 2019-12-10 RX ORDER — NALOXONE HYDROCHLORIDE 0.4 MG/ML
80 INJECTION, SOLUTION INTRAMUSCULAR; INTRAVENOUS; SUBCUTANEOUS AS NEEDED
Status: DISCONTINUED | OUTPATIENT
Start: 2019-12-10 | End: 2019-12-10 | Stop reason: HOSPADM

## 2019-12-10 RX ORDER — POLYETHYLENE GLYCOL 3350 17 G/17G
17 POWDER, FOR SOLUTION ORAL DAILY PRN
Status: DISCONTINUED | OUTPATIENT
Start: 2019-12-10 | End: 2019-12-13

## 2019-12-10 RX ORDER — HYDROMORPHONE HYDROCHLORIDE 1 MG/ML
0.2 INJECTION, SOLUTION INTRAMUSCULAR; INTRAVENOUS; SUBCUTANEOUS EVERY 5 MIN PRN
Status: DISCONTINUED | OUTPATIENT
Start: 2019-12-10 | End: 2019-12-10 | Stop reason: HOSPADM

## 2019-12-10 RX ORDER — CELECOXIB 200 MG/1
400 CAPSULE ORAL ONCE
Status: COMPLETED | OUTPATIENT
Start: 2019-12-10 | End: 2019-12-10

## 2019-12-10 RX ORDER — HEPARIN SODIUM 5000 [USP'U]/ML
5000 INJECTION, SOLUTION INTRAVENOUS; SUBCUTANEOUS ONCE
Status: COMPLETED | OUTPATIENT
Start: 2019-12-10 | End: 2019-12-10

## 2019-12-10 RX ORDER — GLYCOPYRROLATE 0.2 MG/ML
INJECTION INTRAMUSCULAR; INTRAVENOUS AS NEEDED
Status: DISCONTINUED | OUTPATIENT
Start: 2019-12-10 | End: 2019-12-10 | Stop reason: SURG

## 2019-12-10 RX ORDER — HYDROMORPHONE HYDROCHLORIDE 1 MG/ML
0.4 INJECTION, SOLUTION INTRAMUSCULAR; INTRAVENOUS; SUBCUTANEOUS EVERY 5 MIN PRN
Status: DISCONTINUED | OUTPATIENT
Start: 2019-12-10 | End: 2019-12-10 | Stop reason: HOSPADM

## 2019-12-10 RX ORDER — KETAMINE HYDROCHLORIDE 50 MG/ML
INJECTION, SOLUTION, CONCENTRATE INTRAMUSCULAR; INTRAVENOUS AS NEEDED
Status: DISCONTINUED | OUTPATIENT
Start: 2019-12-10 | End: 2019-12-10 | Stop reason: SURG

## 2019-12-10 RX ORDER — HEPARIN SODIUM 5000 [USP'U]/ML
5000 INJECTION, SOLUTION INTRAVENOUS; SUBCUTANEOUS EVERY 12 HOURS SCHEDULED
Status: DISCONTINUED | OUTPATIENT
Start: 2019-12-11 | End: 2019-12-13

## 2019-12-10 RX ORDER — EPHEDRINE SULFATE 50 MG/ML
INJECTION, SOLUTION INTRAVENOUS AS NEEDED
Status: DISCONTINUED | OUTPATIENT
Start: 2019-12-10 | End: 2019-12-10 | Stop reason: SURG

## 2019-12-10 RX ORDER — LIDOCAINE HYDROCHLORIDE 10 MG/ML
INJECTION, SOLUTION EPIDURAL; INFILTRATION; INTRACAUDAL; PERINEURAL AS NEEDED
Status: DISCONTINUED | OUTPATIENT
Start: 2019-12-10 | End: 2019-12-10 | Stop reason: SURG

## 2019-12-10 RX ORDER — CEFAZOLIN SODIUM/WATER 2 G/20 ML
2 SYRINGE (ML) INTRAVENOUS EVERY 8 HOURS
Status: COMPLETED | OUTPATIENT
Start: 2019-12-10 | End: 2019-12-11

## 2019-12-10 RX ORDER — DOCUSATE SODIUM 100 MG/1
100 CAPSULE, LIQUID FILLED ORAL 2 TIMES DAILY
Status: DISCONTINUED | OUTPATIENT
Start: 2019-12-10 | End: 2019-12-13

## 2019-12-10 RX ORDER — DEXAMETHASONE SODIUM PHOSPHATE 4 MG/ML
VIAL (ML) INJECTION AS NEEDED
Status: DISCONTINUED | OUTPATIENT
Start: 2019-12-10 | End: 2019-12-10 | Stop reason: SURG

## 2019-12-10 RX ORDER — GABAPENTIN 300 MG/1
300 CAPSULE ORAL NIGHTLY
Status: DISCONTINUED | OUTPATIENT
Start: 2019-12-10 | End: 2019-12-13

## 2019-12-10 RX ORDER — MORPHINE SULFATE 4 MG/ML
2 INJECTION, SOLUTION INTRAMUSCULAR; INTRAVENOUS EVERY 10 MIN PRN
Status: DISCONTINUED | OUTPATIENT
Start: 2019-12-10 | End: 2019-12-10 | Stop reason: HOSPADM

## 2019-12-10 RX ORDER — HYDROCODONE BITARTRATE AND ACETAMINOPHEN 5; 325 MG/1; MG/1
2 TABLET ORAL AS NEEDED
Status: DISCONTINUED | OUTPATIENT
Start: 2019-12-10 | End: 2019-12-10 | Stop reason: HOSPADM

## 2019-12-10 RX ORDER — CEFAZOLIN SODIUM/WATER 2 G/20 ML
2 SYRINGE (ML) INTRAVENOUS ONCE
Status: COMPLETED | OUTPATIENT
Start: 2019-12-10 | End: 2019-12-10

## 2019-12-10 RX ORDER — HYDROCODONE BITARTRATE AND ACETAMINOPHEN 5; 325 MG/1; MG/1
1 TABLET ORAL AS NEEDED
Status: DISCONTINUED | OUTPATIENT
Start: 2019-12-10 | End: 2019-12-10 | Stop reason: HOSPADM

## 2019-12-10 RX ORDER — MAGNESIUM OXIDE 400 MG (241.3 MG MAGNESIUM) TABLET
400 TABLET DAILY
Status: DISCONTINUED | OUTPATIENT
Start: 2019-12-10 | End: 2019-12-13

## 2019-12-10 RX ORDER — LIDOCAINE HYDROCHLORIDE ANHYDROUS AND DEXTROSE MONOHYDRATE .8; 5 G/100ML; G/100ML
INJECTION, SOLUTION INTRAVENOUS CONTINUOUS PRN
Status: DISCONTINUED | OUTPATIENT
Start: 2019-12-10 | End: 2019-12-10 | Stop reason: SURG

## 2019-12-10 RX ORDER — SODIUM CHLORIDE, SODIUM LACTATE, POTASSIUM CHLORIDE, CALCIUM CHLORIDE 600; 310; 30; 20 MG/100ML; MG/100ML; MG/100ML; MG/100ML
INJECTION, SOLUTION INTRAVENOUS CONTINUOUS
Status: DISCONTINUED | OUTPATIENT
Start: 2019-12-10 | End: 2019-12-13

## 2019-12-10 RX ORDER — OXYCODONE HYDROCHLORIDE 5 MG/1
10 TABLET ORAL EVERY 4 HOURS PRN
Status: DISCONTINUED | OUTPATIENT
Start: 2019-12-10 | End: 2019-12-13

## 2019-12-10 RX ORDER — SODIUM CHLORIDE 0.9 % (FLUSH) 0.9 %
10 SYRINGE (ML) INJECTION AS NEEDED
Status: DISCONTINUED | OUTPATIENT
Start: 2019-12-10 | End: 2019-12-13

## 2019-12-10 RX ORDER — GABAPENTIN 300 MG/1
300 CAPSULE ORAL ONCE
Status: COMPLETED | OUTPATIENT
Start: 2019-12-10 | End: 2019-12-10

## 2019-12-10 RX ORDER — SODIUM CHLORIDE, SODIUM LACTATE, POTASSIUM CHLORIDE, CALCIUM CHLORIDE 600; 310; 30; 20 MG/100ML; MG/100ML; MG/100ML; MG/100ML
100 INJECTION, SOLUTION INTRAVENOUS CONTINUOUS
Status: DISCONTINUED | OUTPATIENT
Start: 2019-12-10 | End: 2019-12-13

## 2019-12-10 RX ORDER — ACETAMINOPHEN 500 MG
1000 TABLET ORAL ONCE
Status: COMPLETED | OUTPATIENT
Start: 2019-12-10 | End: 2019-12-10

## 2019-12-10 RX ORDER — MORPHINE SULFATE 10 MG/ML
6 INJECTION, SOLUTION INTRAMUSCULAR; INTRAVENOUS EVERY 10 MIN PRN
Status: DISCONTINUED | OUTPATIENT
Start: 2019-12-10 | End: 2019-12-10 | Stop reason: HOSPADM

## 2019-12-10 RX ORDER — ROCURONIUM BROMIDE 10 MG/ML
INJECTION, SOLUTION INTRAVENOUS AS NEEDED
Status: DISCONTINUED | OUTPATIENT
Start: 2019-12-10 | End: 2019-12-10 | Stop reason: SURG

## 2019-12-10 RX ORDER — HYDROMORPHONE HYDROCHLORIDE 1 MG/ML
0.4 INJECTION, SOLUTION INTRAMUSCULAR; INTRAVENOUS; SUBCUTANEOUS EVERY 2 HOUR PRN
Status: DISCONTINUED | OUTPATIENT
Start: 2019-12-10 | End: 2019-12-13

## 2019-12-10 RX ORDER — METRONIDAZOLE 500 MG/100ML
500 INJECTION, SOLUTION INTRAVENOUS EVERY 8 HOURS
Status: COMPLETED | OUTPATIENT
Start: 2019-12-10 | End: 2019-12-11

## 2019-12-10 RX ORDER — HYDROMORPHONE HYDROCHLORIDE 1 MG/ML
0.2 INJECTION, SOLUTION INTRAMUSCULAR; INTRAVENOUS; SUBCUTANEOUS EVERY 2 HOUR PRN
Status: DISCONTINUED | OUTPATIENT
Start: 2019-12-10 | End: 2019-12-13

## 2019-12-10 RX ORDER — ONDANSETRON 2 MG/ML
4 INJECTION INTRAMUSCULAR; INTRAVENOUS EVERY 4 HOURS PRN
Status: DISCONTINUED | OUTPATIENT
Start: 2019-12-10 | End: 2019-12-13

## 2019-12-10 RX ORDER — HALOPERIDOL 5 MG/ML
0.25 INJECTION INTRAMUSCULAR ONCE AS NEEDED
Status: DISCONTINUED | OUTPATIENT
Start: 2019-12-10 | End: 2019-12-10 | Stop reason: HOSPADM

## 2019-12-10 RX ORDER — MORPHINE SULFATE 4 MG/ML
4 INJECTION, SOLUTION INTRAMUSCULAR; INTRAVENOUS EVERY 10 MIN PRN
Status: DISCONTINUED | OUTPATIENT
Start: 2019-12-10 | End: 2019-12-10 | Stop reason: HOSPADM

## 2019-12-10 RX ORDER — HYDROMORPHONE HYDROCHLORIDE 1 MG/ML
0.8 INJECTION, SOLUTION INTRAMUSCULAR; INTRAVENOUS; SUBCUTANEOUS EVERY 2 HOUR PRN
Status: DISCONTINUED | OUTPATIENT
Start: 2019-12-10 | End: 2019-12-13

## 2019-12-10 RX ORDER — METRONIDAZOLE 500 MG/100ML
500 INJECTION, SOLUTION INTRAVENOUS ONCE
Status: COMPLETED | OUTPATIENT
Start: 2019-12-10 | End: 2019-12-10

## 2019-12-10 RX ORDER — PROCHLORPERAZINE EDISYLATE 5 MG/ML
5 INJECTION INTRAMUSCULAR; INTRAVENOUS ONCE AS NEEDED
Status: DISCONTINUED | OUTPATIENT
Start: 2019-12-10 | End: 2019-12-10 | Stop reason: HOSPADM

## 2019-12-10 RX ORDER — OXYCODONE HYDROCHLORIDE 5 MG/1
5 TABLET ORAL EVERY 4 HOURS PRN
Status: DISCONTINUED | OUTPATIENT
Start: 2019-12-10 | End: 2019-12-13

## 2019-12-10 RX ORDER — LABETALOL HYDROCHLORIDE 5 MG/ML
5 INJECTION, SOLUTION INTRAVENOUS ONCE
Status: COMPLETED | OUTPATIENT
Start: 2019-12-10 | End: 2019-12-10

## 2019-12-10 RX ORDER — OXYCODONE HYDROCHLORIDE 5 MG/1
15 TABLET ORAL EVERY 4 HOURS PRN
Status: DISCONTINUED | OUTPATIENT
Start: 2019-12-10 | End: 2019-12-13

## 2019-12-10 RX ORDER — SODIUM CHLORIDE 0.9 % (FLUSH) 0.9 %
10 SYRINGE (ML) INJECTION AS NEEDED
Status: DISCONTINUED | OUTPATIENT
Start: 2019-12-10 | End: 2019-12-10 | Stop reason: HOSPADM

## 2019-12-10 RX ORDER — ONDANSETRON 2 MG/ML
4 INJECTION INTRAMUSCULAR; INTRAVENOUS ONCE AS NEEDED
Status: DISCONTINUED | OUTPATIENT
Start: 2019-12-10 | End: 2019-12-10 | Stop reason: HOSPADM

## 2019-12-10 RX ORDER — NEOSTIGMINE METHYLSULFATE 0.5 MG/ML
INJECTION INTRAVENOUS AS NEEDED
Status: DISCONTINUED | OUTPATIENT
Start: 2019-12-10 | End: 2019-12-10 | Stop reason: SURG

## 2019-12-10 RX ORDER — SODIUM CHLORIDE, SODIUM LACTATE, POTASSIUM CHLORIDE, CALCIUM CHLORIDE 600; 310; 30; 20 MG/100ML; MG/100ML; MG/100ML; MG/100ML
INJECTION, SOLUTION INTRAVENOUS CONTINUOUS
Status: DISCONTINUED | OUTPATIENT
Start: 2019-12-10 | End: 2019-12-10 | Stop reason: HOSPADM

## 2019-12-10 RX ORDER — SODIUM CHLORIDE 9 MG/ML
INJECTION, SOLUTION INTRAVENOUS CONTINUOUS PRN
Status: DISCONTINUED | OUTPATIENT
Start: 2019-12-10 | End: 2019-12-10 | Stop reason: SURG

## 2019-12-10 RX ORDER — HYDROMORPHONE HYDROCHLORIDE 1 MG/ML
0.6 INJECTION, SOLUTION INTRAMUSCULAR; INTRAVENOUS; SUBCUTANEOUS EVERY 5 MIN PRN
Status: DISCONTINUED | OUTPATIENT
Start: 2019-12-10 | End: 2019-12-10 | Stop reason: HOSPADM

## 2019-12-10 RX ORDER — MIDAZOLAM HYDROCHLORIDE 1 MG/ML
INJECTION INTRAMUSCULAR; INTRAVENOUS AS NEEDED
Status: DISCONTINUED | OUTPATIENT
Start: 2019-12-10 | End: 2019-12-10 | Stop reason: SURG

## 2019-12-10 RX ORDER — ONDANSETRON 2 MG/ML
INJECTION INTRAMUSCULAR; INTRAVENOUS AS NEEDED
Status: DISCONTINUED | OUTPATIENT
Start: 2019-12-10 | End: 2019-12-10 | Stop reason: SURG

## 2019-12-10 RX ORDER — ACETAMINOPHEN 500 MG
1000 TABLET ORAL EVERY 8 HOURS
Status: DISCONTINUED | OUTPATIENT
Start: 2019-12-10 | End: 2019-12-13

## 2019-12-10 RX ORDER — BUPIVACAINE HYDROCHLORIDE AND EPINEPHRINE 5; 5 MG/ML; UG/ML
INJECTION, SOLUTION PERINEURAL AS NEEDED
Status: DISCONTINUED | OUTPATIENT
Start: 2019-12-10 | End: 2019-12-10 | Stop reason: HOSPADM

## 2019-12-10 RX ADMIN — ROCURONIUM BROMIDE 50 MG: 10 INJECTION, SOLUTION INTRAVENOUS at 14:36:00

## 2019-12-10 RX ADMIN — SODIUM CHLORIDE, SODIUM LACTATE, POTASSIUM CHLORIDE, CALCIUM CHLORIDE: 600; 310; 30; 20 INJECTION, SOLUTION INTRAVENOUS at 18:17:00

## 2019-12-10 RX ADMIN — ROCURONIUM BROMIDE 10 MG: 10 INJECTION, SOLUTION INTRAVENOUS at 16:32:00

## 2019-12-10 RX ADMIN — LIDOCAINE HYDROCHLORIDE 70 MG: 10 INJECTION, SOLUTION EPIDURAL; INFILTRATION; INTRACAUDAL; PERINEURAL at 15:01:00

## 2019-12-10 RX ADMIN — ROCURONIUM BROMIDE 20 MG: 10 INJECTION, SOLUTION INTRAVENOUS at 15:50:00

## 2019-12-10 RX ADMIN — GLYCOPYRROLATE 1 MG: 0.2 INJECTION INTRAMUSCULAR; INTRAVENOUS at 18:38:00

## 2019-12-10 RX ADMIN — SODIUM CHLORIDE, SODIUM LACTATE, POTASSIUM CHLORIDE, CALCIUM CHLORIDE: 600; 310; 30; 20 INJECTION, SOLUTION INTRAVENOUS at 14:31:00

## 2019-12-10 RX ADMIN — EPHEDRINE SULFATE 5 MG: 50 INJECTION, SOLUTION INTRAVENOUS at 18:05:00

## 2019-12-10 RX ADMIN — METRONIDAZOLE 500 MG: 500 INJECTION, SOLUTION INTRAVENOUS at 14:59:00

## 2019-12-10 RX ADMIN — SODIUM CHLORIDE: 9 INJECTION, SOLUTION INTRAVENOUS at 17:34:00

## 2019-12-10 RX ADMIN — SODIUM CHLORIDE: 9 INJECTION, SOLUTION INTRAVENOUS at 14:43:00

## 2019-12-10 RX ADMIN — CEFAZOLIN SODIUM/WATER 2 G: 2 G/20 ML SYRINGE (ML) INTRAVENOUS at 14:51:00

## 2019-12-10 RX ADMIN — ONDANSETRON 4 MG: 2 INJECTION INTRAMUSCULAR; INTRAVENOUS at 17:35:00

## 2019-12-10 RX ADMIN — LIDOCAINE HYDROCHLORIDE 50 MG: 10 INJECTION, SOLUTION EPIDURAL; INFILTRATION; INTRACAUDAL; PERINEURAL at 14:36:00

## 2019-12-10 RX ADMIN — SODIUM CHLORIDE: 9 INJECTION, SOLUTION INTRAVENOUS at 19:01:00

## 2019-12-10 RX ADMIN — DEXAMETHASONE SODIUM PHOSPHATE 4 MG: 4 MG/ML VIAL (ML) INJECTION at 17:35:00

## 2019-12-10 RX ADMIN — KETAMINE HYDROCHLORIDE 40 MG: 50 INJECTION, SOLUTION, CONCENTRATE INTRAMUSCULAR; INTRAVENOUS at 14:43:00

## 2019-12-10 RX ADMIN — MIDAZOLAM HYDROCHLORIDE 2 MG: 1 INJECTION INTRAMUSCULAR; INTRAVENOUS at 14:31:00

## 2019-12-10 RX ADMIN — LIDOCAINE HYDROCHLORIDE ANHYDROUS AND DEXTROSE MONOHYDRATE 20 ML/HR: .8; 5 INJECTION, SOLUTION INTRAVENOUS at 15:01:00

## 2019-12-10 RX ADMIN — NEOSTIGMINE METHYLSULFATE 5 MG: 0.5 INJECTION INTRAVENOUS at 18:38:00

## 2019-12-10 NOTE — INTERVAL H&P NOTE
Pre-op Diagnosis: Malignant neoplasm of sigmoid colon ([facility] [address] [C18.7]    The above referenced H&P was reviewed by Elinor Omalley MD on 12/10/2019, the patient was examined and no significant changes have occurred in the patient's condition since the H&P was p

## 2019-12-10 NOTE — ANESTHESIA PROCEDURE NOTES
Airway  Date/Time: 12/10/2019 2:40 PM  Urgency: elective    Airway not difficult    General Information and Staff    Patient location during procedure: OR  Anesthesiologist: Sue Carlson MD  Performed: anesthesiologist     Indications and Patient Condition

## 2019-12-10 NOTE — ANESTHESIA PROCEDURE NOTES
Peripheral IV  Date/Time: 12/10/2019 2:45 PM  Inserted by: Willy Bruno MD    Placement  Needle size: 16 G  Laterality: right  Location: arm  Local anesthetic: none  Site prep: alcohol  Technique: anatomical landmarks  Attempts: 1

## 2019-12-10 NOTE — ANESTHESIA PREPROCEDURE EVALUATION
Anesthesia PreOp Note    HPI:     Ludin Ma is a 49year old male who presents for preoperative consultation requested by: Amos Delacruz MD    Date of Surgery: 12/10/2019    Procedure(s):  LAPAROSCOPIC COLON RESECTION - LEFT  Indication: Malignant neop Intravenous, Continuous, Brock Hanna MD, Last Rate: 20 mL/hr at 12/10/19 1141  Normal Saline Flush 0.9 % injection 10 mL, 10 mL, Intravenous, PRN, Brock Hanna MD  ceFAZolin sodium (ANCEF/KEFZOL) 2 GM/20ML premix IV syringe 2 g, 2 g, Intravenous, Once organization: Not on file        Attends meetings of clubs or organizations: Not on file        Relationship status: Not on file      Intimate partner violence:        Fear of current or ex partner: Not on file        Emotionally abused: Not on file Resp:  15    Temp:  98 °F (36.7 °C)    TempSrc:  Oral    SpO2:  99%    Weight: 81.6 kg (180 lb) 80.6 kg (177 lb 12.8 oz)    Height: 1.727 m (5' 8\") 1.727 m (5' 8\")         Anesthesia Evaluation     Patient summary reviewed and Nursing notes reviewed

## 2019-12-10 NOTE — TELEPHONE ENCOUNTER
Left message for pt to call back Forms dept with HR fax # if he needs his forms faxed out. Otherwise his FMLA has been attached to his Mychart.

## 2019-12-11 PROCEDURE — 99232 SBSQ HOSP IP/OBS MODERATE 35: CPT | Performed by: HOSPITALIST

## 2019-12-11 NOTE — PROGRESS NOTES
[facility]    Progress Note    Alex Galarza Patient Status:  Inpatient    5/15/1965 MRN [id number]   Location [facility] 4W/SW/SE Attending Rolly Caba MD   Hosp Day # 1 PCP Reba Tidwell, DO       Subjective:     Doing

## 2019-12-11 NOTE — BRIEF OP NOTE
Pre-Operative Diagnosis: Malignant neoplasm of sigmoid colon (HCC) [C18.7]     Post-Operative Diagnosis: Malignant neoplasm of sigmoid colon (HCC) [C18.7]      Procedure Performed:   Procedure(s):  Laparoscopic assisted recto sigmoid resection.      Surgeon

## 2019-12-11 NOTE — PROGRESS NOTES
[facility] HOSP - [facility]    Progress Note    Montalvo Abu Patient Status:  Surgery Admit - Inpt    5/15/1965 MRN [id number]   Location [address] Attending Su Dent MD   Hosp Day # 0 PCP Debra Driscoll, DO Results   Component Value Date    WBC 6.0 11/25/2019    HGB 15.4 11/25/2019    HCT 44.4 11/25/2019    .0 11/25/2019    CREATSERUM 1.05 11/25/2019    BUN 17 11/25/2019     11/25/2019    K 4.1 11/25/2019     11/25/2019    CO2 29.0 11/25/20

## 2019-12-11 NOTE — PHYSICAL THERAPY NOTE
PHYSICAL THERAPY EVALUATION - INPATIENT     Room Number: 438/438-A  Evaluation Date: 12/11/2019  Type of Evaluation: Initial   Physician Order: PT Eval and Treat    Presenting Problem: Colon resection  Reason for Therapy: Mobility Dysfunction and Discharg cell carcinoma of skin 2014    right cheek   • Visual impairment     glasses       Past Surgical History  Past Surgical History:   Procedure Laterality Date   • CHEMOSURG MOHS 1ST STAGE Right 1994    cheek   • COLONOSCOPY  11/2019    clip left from colonos Turning over in bed (including adjusting bedclothes, sheets and blankets)?: None   -   Sitting down on and standing up from a chair with arms (e.g., wheelchair, bedside commode, etc.): None   -   Moving from lying on back to sitting on the side of the bed? Goal #4   Current Status NT   Goal #5 Patient to demonstrate independence with home activity/exercise instructions provided to patient in preparation for discharge.    Goal #5   Current Status Ongoing   Goal #6    Goal #6  Current Status

## 2019-12-11 NOTE — ANESTHESIA POSTPROCEDURE EVALUATION
Patient: Viktoriya Hayes    Procedure Summary     Date:  12/10/19 Room / Location:  [address] [place] [address] MAIN OR 04 / [address] [place] [address] OR    Anesthesia Start:  1570 Anesthesia Stop:  1905    Procedure:  LAPAROSCOPIC COLON RESECTION - LEFT (N/A ) Diagnosis:       Malignant neoplasm of

## 2019-12-11 NOTE — PAYOR COMM NOTE
--------------  ADMISSION REVIEW     Payor: Robbie SMALLWOOD/EDDI  Subscriber #:  [id number]  Authorization Number: [id number]    Admit date: 12/10/19  Admit time: 2103       Admitting Physician: Marifer Sheets MD  Attending Physician:  Ousmane Doty Lacey Bermudez, RN      dexamethasone Sodium Phosphate (DECADRON) 4 MG/ML injection     Date Action Dose Route User    12/10/2019 1735 Given 4 mg Intravenous Orin Cheatham MD            EPHEDrine Sulfate injection     Date Action Dose Route User    12/10/ Route User    12/10/2019 1431 New Bag (none) Intravenous Sharon Ashby MD      lactated ringers infusion 100 ML HR     Date Action Dose Route User    12/10/2019 2032 New Bag (none) Intravenous Janene Coleman, RN            lidocaine PF (XYLOCAINE) 1%

## 2019-12-11 NOTE — PROGRESS NOTES
[facility]    Progress Note    Iván Jacki Patient Status:  Inpatient    5/15/1965 MRN [id number]   [facility] Attending Lucas Collins MD   Hosp Day # 1 PCP Martin Fenton DO     Assessment and Plan: distended and min tender    Results:     Lab Results   Component Value Date    WBC 6.0 11/25/2019    HGB 15.4 11/25/2019    HCT 44.4 11/25/2019    .0 11/25/2019    CREATSERUM 1.05 11/25/2019    BUN 17 11/25/2019     11/25/2019    K 4.1 11/25/2

## 2019-12-11 NOTE — OPERATIVE REPORT
[facility]    PATIENT'S NAME: Angelica Dyer   ATTENDING PHYSICIAN: Talya Bone MD   OPERATING PHYSICIAN: Shital Llanos MD   PATIENT ACCOUNT#:   [de-identified]    LOCATION:  [address] [place] [address] #:   [id number]       DATE OF BIRTH:  [birth year] malignancy. He has a history of bilateral inguinal hernia repair. OPERATIVE TECHNIQUE:  With the patient in supine position, a general anesthetic was induced. Salgado catheter and orogastric tubes were inserted.   He was carefully placed into lithotomy p ureters were identified and preserved bilaterally. I then opened up the peritoneum on the medial side of the sigmoid colon and rectum and the main vascular pedicle identified, just beyond the aortic bifurcation.   Two white loads of the Endo ISABELLE stapler we anal verge. There was no significant bleeding and no evidence for leak with insufflation under water. The proctoscope was removed. The pelvis was then copiously irrigated and adequate hemostasis noted.   The omentum was fixed in the upper abdomen around

## 2019-12-11 NOTE — PLAN OF CARE
Pt A&Ox4. Pt complained of some abdominal pain, dilaudid given. Tolerating ERAS and clear liquid diet, no n/v. Scheduled ancef and flagyl given. 4 lap sites on abdomen are CDI. Salgado maintained with good output. SCDs in place for DVT prophylaxis.  Sleeping influences on pain and pain management  - Manage/alleviate anxiety  - Utilize distraction and/or relaxation techniques  - Monitor for opioid side effects  - Notify MD/LIP if interventions unsuccessful or patient reports new pain  - Anticipate increased bernadette function  Description  INTERVENTIONS:  - Assess bowel function  - Maintain adequate hydration with IV or PO as ordered and tolerated  - Evaluate effectiveness of GI medications  - Encourage mobilization and activity  - Obtain nutritional consult as needed

## 2019-12-12 PROCEDURE — 99231 SBSQ HOSP IP/OBS SF/LOW 25: CPT | Performed by: HOSPITALIST

## 2019-12-12 NOTE — PLAN OF CARE
Pt A&Ox4, VSS. Pt had minimal pain overnight, scheduled tylenol given. Saline locked. Tolerating clear liquids, no n/v. Up independently. Sleeping throughout the night, call light within reach.      Problem: Patient Centered Care  Goal: Patient preferences techniques  - Monitor for opioid side effects  - Notify MD/LIP if interventions unsuccessful or patient reports new pain  - Anticipate increased pain with activity and pre-medicate as appropriate  Outcome: Progressing     Problem: SAFETY ADULT - FALL  Goal tolerated  - Evaluate effectiveness of GI medications  - Encourage mobilization and activity  - Obtain nutritional consult as needed  - Establish a toileting routine/schedule  - Consider collaborating with pharmacy to review patient's medication profile  O

## 2019-12-12 NOTE — PLAN OF CARE
Plan of care reviewed with Gary Ward. Gary Ward is doing very well. He is ambulating frequently in the hallway. Tolerating full liquid diet. Pain managed with scheduled tylenol. Salgado removed this am and patient is voiding freely.  Passing gas and having bowel moveme pain management  - Manage/alleviate anxiety  - Utilize distraction and/or relaxation techniques  - Monitor for opioid side effects  - Notify MD/LIP if interventions unsuccessful or patient reports new pain  - Anticipate increased pain with activity and pre function  Description  INTERVENTIONS:  - Assess bowel function  - Maintain adequate hydration with IV or PO as ordered and tolerated  - Evaluate effectiveness of GI medications  - Encourage mobilization and activity  - Obtain nutritional consult as needed

## 2019-12-12 NOTE — PLAN OF CARE
Patient doing well today. Tolerating clear liquids, will probably advance tomorrow per MD. Following ERAS protocol. Up for meals, ambulating in hallway, pain well controlled.

## 2019-12-12 NOTE — PHYSICAL THERAPY NOTE
PHYSICAL THERAPY TREATMENT NOTE - INPATIENT     Room Number: 438/438-A       Presenting Problem: Colon resection    Problem List  Principal Problem:    Rectal cancer [facility])  Active Problems:    Preoperative testing      PHYSICAL THERAPY ASSESSMENT   Pt is r INPATIENT SHORT FORM - BASIC MOBILITY  How much difficulty does the patient currently have. ..  -   Turning over in bed (including adjusting bedclothes, sheets and blankets)?: None   -   Sitting down on and standing up from a chair with arms (e.g., wheelcha Ongoing   Goal #6    Goal #6  Current Status

## 2019-12-12 NOTE — PROGRESS NOTES
[facility]    Progress Note    Carlota Lott Patient Status:  Inpatient    5/15/1965 MRN [id number]   Location [facility] 4W/SW/SE Attending Karla Dickson MD   Hosp Day # 2 PCP Shayna Ayala DO     Assessment and Plan: Yes

## 2019-12-13 VITALS
RESPIRATION RATE: 18 BRPM | TEMPERATURE: 99 F | DIASTOLIC BLOOD PRESSURE: 88 MMHG | HEART RATE: 77 BPM | OXYGEN SATURATION: 93 % | WEIGHT: 177.81 LBS | HEIGHT: 68 IN | BODY MASS INDEX: 26.95 KG/M2 | SYSTOLIC BLOOD PRESSURE: 131 MMHG

## 2019-12-13 PROCEDURE — 99238 HOSP IP/OBS DSCHRG MGMT 30/<: CPT | Performed by: HOSPITALIST

## 2019-12-13 NOTE — PLAN OF CARE
Problem: Patient Centered Care  Goal: Patient preferences are identified and integrated in the patient's plan of care  Description  Interventions:  - What would you like us to know as we care for you?  I live with my children  - Provide timely, complete, pre-medicate as appropriate  Outcome: Adequate for Discharge     Problem: SAFETY ADULT - FALL  Goal: Free from fall injury  Description  INTERVENTIONS:  - Assess pt frequently for physical needs  - Identify cognitive and physical deficits and behaviors benson needed  - Establish a toileting routine/schedule  - Consider collaborating with pharmacy to review patient's medication profile  Outcome: Adequate for Discharge     Problem: SKIN/TISSUE INTEGRITY - ADULT  Goal: Incision(s), wounds(s) or drain site(s) heali

## 2019-12-13 NOTE — PLAN OF CARE
Problem: Patient Centered Care  Goal: Patient preferences are identified and integrated in the patient's plan of care  Description  Interventions:  - What would you like us to know as we care for you?  I live with my children  - Provide timely, complete, appropriate  Outcome: Progressing     Problem: SAFETY ADULT - FALL  Goal: Free from fall injury  Description  INTERVENTIONS:  - Assess pt frequently for physical needs  - Identify cognitive and physical deficits and behaviors that affect risk of falls.   - Consider collaborating with pharmacy to review patient's medication profile  Outcome: Progressing     Problem: SKIN/TISSUE INTEGRITY - ADULT  Goal: Incision(s), wounds(s) or drain site(s) healing without S/S of infection  Description  INTERVENTIONS:  - Ass

## 2019-12-13 NOTE — PROGRESS NOTES
[facility]    Progress Note    Clifford Rodriguez Patient Status:  Inpatient    5/15/1965 MRN [id number]   Location [facility] 4W/SW/SE Attending Esvin Campos MD   Hosp Day # 2 PCP Lynette Solis DO       Subjective:     Doing

## 2019-12-13 NOTE — DISCHARGE SUMMARY
[facility] HOSPITALIST  DISCHARGE SUMMARY     Ventura Taylor Patient Status:  Inpatient    5/15/1965 MRN [id number]   Location [facility] 4W/SW/SE Attending No att. providers found   Hosp Day # 3 PCP Bruce Berman DO     Date of Admission: 12/10/2 Abdomen: Soft, nontender, nondistended. Positive bowel sounds. No rebound or guarding. Neurologic: No focal neurological deficits. Musculoskeletal: Moves all extremities. Extremities: No edema.   ------------------------------------------------------

## 2019-12-16 ENCOUNTER — PATIENT OUTREACH (OUTPATIENT)
Dept: CASE MANAGEMENT | Age: 54
End: 2019-12-16

## 2019-12-16 DIAGNOSIS — C20 RECTAL CANCER (HCC): ICD-10-CM

## 2019-12-16 DIAGNOSIS — Z02.9 ENCOUNTERS FOR UNSPECIFIED ADMINISTRATIVE PURPOSE: ICD-10-CM

## 2019-12-16 PROCEDURE — 1111F DSCHRG MED/CURRENT MED MERGE: CPT

## 2019-12-16 NOTE — PAYOR COMM NOTE
--------------  DISCHARGE REVIEW-----REQUESTING ADDITIONAL DAY 12/12 WITH DISCHARGE ON 12/13      Payor: [employer] [facility] SELIN/EDDI  Subscriber #:  [id number]  Authorization Number: [id number]    Admit date: 12/10/19  Admit time:  2103  Discharge Date: 12/13/2019 Commonly known as:  MYCIFRADIN        ZINC-220 220 (50 Zn) MG Caps  Generic drug:  zinc sulfate               Follow-up appointment:   Elsa Hamm MD  In 2 weeks  For wound re-check    Meka Roblero, DO  Schedule an appointment as soon as possible fo Blood pressure (!) 133/94, pulse 74, temperature 98.4 °F (36.9 °C), temperature source Oral, resp. rate 20, height 5' 8\" (1.727 m), weight 177 lb 12.8 oz (80.6 kg), SpO2 98 %.     HEENT: conjunctivae/corneas clear. PERRL, EOM's intact.   Neck: supple  Pulm

## 2019-12-17 ENCOUNTER — OFFICE VISIT (OUTPATIENT)
Dept: FAMILY MEDICINE CLINIC | Facility: CLINIC | Age: 54
End: 2019-12-17
Payer: COMMERCIAL

## 2019-12-17 VITALS
BODY MASS INDEX: 26.52 KG/M2 | TEMPERATURE: 98 F | HEIGHT: 68 IN | WEIGHT: 175 LBS | SYSTOLIC BLOOD PRESSURE: 122 MMHG | HEART RATE: 69 BPM | DIASTOLIC BLOOD PRESSURE: 79 MMHG

## 2019-12-17 DIAGNOSIS — C18.7 MALIGNANT NEOPLASM OF SIGMOID COLON (HCC): Primary | ICD-10-CM

## 2019-12-17 DIAGNOSIS — C20 MALIGNANT NEOPLASM OF RECTUM (HCC): ICD-10-CM

## 2019-12-17 PROCEDURE — 99214 OFFICE O/P EST MOD 30 MIN: CPT | Performed by: FAMILY MEDICINE

## 2019-12-17 RX ORDER — ACETAMINOPHEN 500 MG
1000 TABLET ORAL EVERY 8 HOURS PRN
COMMUNITY
End: 2021-06-24

## 2019-12-17 NOTE — PROGRESS NOTES
HPI:    Rimma Khanna is a 49year old male here today for hospital follow up.    He was discharged from Inpatient hospital, [facility] AND [facility]  to Home   Admission Date: 12/10/19   Discharge Date: 12/13/19  Hospital Discharge Diagnoses (since 11/17/2019) chemosurg mohs 1st stage (Right, 1994); Inguinal hernia repair (Left, 09/19/2019); colonoscopy (11/2019); and hernia surgery. He family history includes Breast Cancer in his mother; Heart Disease in his father; Hypertension in his father;  Other in his s Management Certification:  I certify that the following are true:  Communication with the patient was made within 2 business days of discharge on date above   Medical Decision Making- Based on service period of discharge to 30 days:   · Number of Possible

## 2019-12-17 NOTE — PROGRESS NOTES
Initial Post Discharge Follow Up   Discharge Date: 12/13/19  Contact Date: 12/17/2019    Consent Verification:  Assessment Completed With: Patient  HIPAA Verified?   Yes    Discharge Dx:  Rectal cancer [facility])  S/P Laparoscopic assisted recto sigmoid resec yes  • (NCM) Was patient given a different diet per AVS? yes  o If so, which diet?   Soft diet  - Are there any barriers to following this diet? no, patient feels he will advance diet soon as he is tolerating well    Medications:   Reviewed medication list [place] [address]  [phone]          PCP TCM/HFU appointment: scheduled at D/C within 7-14 days  no     NCM Reviewed/scheduled/rescheduled PCP TCM/HFU appointment with pt:  Yes, patient has TCM/HFU today with PCP      Have you made all of your follow up appoint

## 2019-12-23 ENCOUNTER — OFFICE VISIT (OUTPATIENT)
Dept: HEMATOLOGY/ONCOLOGY | Facility: HOSPITAL | Age: 54
End: 2019-12-23
Attending: INTERNAL MEDICINE
Payer: COMMERCIAL

## 2019-12-23 ENCOUNTER — OFFICE VISIT (OUTPATIENT)
Dept: SURGERY | Facility: CLINIC | Age: 54
End: 2019-12-23
Payer: COMMERCIAL

## 2019-12-23 ENCOUNTER — TELEPHONE (OUTPATIENT)
Dept: SURGERY | Facility: CLINIC | Age: 54
End: 2019-12-23

## 2019-12-23 VITALS
HEART RATE: 64 BPM | BODY MASS INDEX: 26.99 KG/M2 | TEMPERATURE: 98 F | OXYGEN SATURATION: 100 % | DIASTOLIC BLOOD PRESSURE: 80 MMHG | SYSTOLIC BLOOD PRESSURE: 118 MMHG | RESPIRATION RATE: 16 BRPM | WEIGHT: 174 LBS | HEIGHT: 67.5 IN

## 2019-12-23 DIAGNOSIS — C20 RECTAL CANCER (HCC): Primary | ICD-10-CM

## 2019-12-23 PROCEDURE — 99024 POSTOP FOLLOW-UP VISIT: CPT | Performed by: SURGERY

## 2019-12-23 PROCEDURE — 99213 OFFICE O/P EST LOW 20 MIN: CPT | Performed by: INTERNAL MEDICINE

## 2019-12-23 NOTE — PROGRESS NOTES
HPI     Mik Evans is a 49year old male who is here today for evaluation of diagnosed Rectal cancer (hcc)  (primary encounter diagnosis).     Patient status post hand-assisted laparoscopic rectosigmoid resection, (low anterior resection with low pelvic Breast Cancer Mother    • Other (Other) Sister         parathryoid dysfunction   • Diabetes Neg    • Glaucoma Neg    • Macular degeneration Neg          PHYSICAL EXAM:    /80 (BP Location: Left arm, Patient Position: Sitting, Cuff Size: adult)   Puls supplements resulting in biotin serum concentrations >100 ng/mL. It is recommended that physicians ask all patients who may be on biotin supplementation to stop biotin consumption at least 72 hours prior to collection of a new sample.    [facility] focal lamina propria hemorrhage, negative for carcinoma.          Electronically signed by Jenna Pineda MD on 12/13/2019 at 7334

## 2019-12-23 NOTE — TELEPHONE ENCOUNTER
Pt called stating pt had an appointment 12-23-19. Pt requesting a return to work note as of 1-2-20. Please put note on mychart.   Call to advise

## 2019-12-24 NOTE — PROGRESS NOTES
Postoperative Patient Follow-up      12/23/2019    Carlota Lott 49year old      HPI  Patient presents with:  Post-Op: Pt here for 1st post op s/p Hand-assisted Lap. retosigmoid resection om 12/10/19. Pt denies fever, or dif. w/ bm's or urinaiton.      For

## 2020-01-07 ENCOUNTER — OFFICE VISIT (OUTPATIENT)
Dept: HEMATOLOGY/ONCOLOGY | Facility: HOSPITAL | Age: 55
End: 2020-01-07
Attending: INTERNAL MEDICINE
Payer: COMMERCIAL

## 2020-01-07 DIAGNOSIS — Z08 ENCOUNTER FOR FOLLOW-UP EXAMINATION AFTER COMPLETED TREATMENT FOR MALIGNANT NEOPLASM: Primary | ICD-10-CM

## 2020-01-07 DIAGNOSIS — Z85.048 PERSONAL HISTORY OF RECTAL CANCER: ICD-10-CM

## 2020-01-07 DIAGNOSIS — Z71.9 COUNSELING, UNSPECIFIED: ICD-10-CM

## 2020-01-07 PROCEDURE — 99215 OFFICE O/P EST HI 40 MIN: CPT | Performed by: NURSE PRACTITIONER

## 2020-01-07 NOTE — PROGRESS NOTES
I met with . Kayleen Jericho for a Survivorship Clinic visit to provide a survivorship care plan (SCP) and information related to post-treatment care. He came to the visit alone.  He has a diagnosis of Stage I rectal cancer and was treated with surgery only on 12/1 screen due to previous skin cancer history. Reviewed concerning symptoms that she should report to any Provider. Reviewed possible late and long-term effects related to the treatment that was received.        Reviewed common cancer survivor issue resources provided.   GALINA Cotter

## 2020-06-23 ENCOUNTER — OFFICE VISIT (OUTPATIENT)
Dept: HEMATOLOGY/ONCOLOGY | Facility: HOSPITAL | Age: 55
End: 2020-06-23
Attending: INTERNAL MEDICINE
Payer: COMMERCIAL

## 2020-06-23 VITALS
HEIGHT: 67.5 IN | DIASTOLIC BLOOD PRESSURE: 85 MMHG | WEIGHT: 186 LBS | TEMPERATURE: 98 F | RESPIRATION RATE: 16 BRPM | SYSTOLIC BLOOD PRESSURE: 129 MMHG | OXYGEN SATURATION: 96 % | BODY MASS INDEX: 28.85 KG/M2 | HEART RATE: 69 BPM

## 2020-06-23 DIAGNOSIS — Z85.048 ENCOUNTER FOR FOLLOW-UP SURVEILLANCE OF RECTAL CANCER: Primary | ICD-10-CM

## 2020-06-23 DIAGNOSIS — C20 RECTAL CANCER (HCC): ICD-10-CM

## 2020-06-23 DIAGNOSIS — Z08 ENCOUNTER FOR FOLLOW-UP SURVEILLANCE OF RECTAL CANCER: Primary | ICD-10-CM

## 2020-06-23 DIAGNOSIS — Z85.048 PERSONAL HISTORY OF RECTAL CANCER: ICD-10-CM

## 2020-06-23 PROCEDURE — 99213 OFFICE O/P EST LOW 20 MIN: CPT | Performed by: INTERNAL MEDICINE

## 2020-06-23 NOTE — PROGRESS NOTES
HPI     Nu Ortega is a 48year old male who is here today for evaluation of diagnosed Rectal cancer (hcc). Patient status post hand-assisted laparoscopic rectosigmoid resection, (low anterior resection with low pelvic anastomosis), on 12/10/2019. CHEMOSURG MOHS 1ST STAGE Right 1994    cheek   • COLONOSCOPY  11/2019    clip left from colonoscopy    • INGUINAL HERNIA REPAIR Right 1994   • INGUINAL HERNIA REPAIR Left 09/19/2019   • LAP, SURG; COLECTOMY, PARTIAL, W/ANASTOMOSIS, W/COLOPROCTOSTOMY 12/23/2019  - Pathologic: pT0, pN0, cM0 - Signed by Leticia Arauz MD on 12/23/2019      D/w patient the pathologic staging and that outcomes are excellent. Will have pathology review polyp to look for muscle in sample.      No further treatment recommend

## 2020-12-09 ENCOUNTER — TELEPHONE (OUTPATIENT)
Dept: FAMILY MEDICINE CLINIC | Facility: CLINIC | Age: 55
End: 2020-12-09

## 2020-12-09 DIAGNOSIS — Z12.11 COLON CANCER SCREENING: Primary | ICD-10-CM

## 2020-12-09 NOTE — TELEPHONE ENCOUNTER
Pt sent the following [employer] message under appt requests, please advise    \"One year follow up to colon-rectal cancer surgery. Colonoscopy was recommended by Dr. Bozena Rmaos and FIT test was listed as overdue in [address] \"

## 2020-12-09 NOTE — TELEPHONE ENCOUNTER
Dr. Farhan Molina, please advise.     From: Shawn Bhatt MD On: 06/23/2020 05:06 PM   To: Casey Amaral MD, Delma Angelucci, MD   Priority: Routine   Routing Comments:   He is due for colonoscopy in December of 2020, will be 1 y

## 2020-12-11 ENCOUNTER — TELEPHONE (OUTPATIENT)
Dept: GASTROENTEROLOGY | Facility: CLINIC | Age: 55
End: 2020-12-11

## 2020-12-11 DIAGNOSIS — Z85.038 HISTORY OF COLON CANCER: Primary | ICD-10-CM

## 2020-12-11 RX ORDER — POLYETHYLENE GLYCOL 3350, SODIUM CHLORIDE, POTASSIUM CHLORIDE, SODIUM BICARBONATE, AND SODIUM SULFATE 240; 5.84; 2.98; 6.72; 22.72 G/4L; G/4L; G/4L; G/4L; G/4L
POWDER, FOR SOLUTION ORAL
Qty: 1 BOTTLE | Refills: 0 | Status: SHIPPED | OUTPATIENT
Start: 2020-12-11 | End: 2021-06-24 | Stop reason: ALTCHOICE

## 2020-12-11 NOTE — TELEPHONE ENCOUNTER
[facility] OUTPATIENT SURGERY CENTER        COLONOSCOPY PROCEDURE REPORT     DATE OF PROCEDURE:  11/14/2019      PCP: Stephanie Donis DO     PREOPERATIVE DIAGNOSIS: Screening colonoscopy examination     POSTOPERATIVE DIAGNOSIS:  See impression.      SURGEON: All cuts today were made using snare cautery current. · One of the final cuts down against the wall of the rectosigmoid colon resulted in rapid arterial bleeding. Photographs taken. This was controlled by placement of 2 Endo Clips .   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     Comment:  The specimen was received fragmented precluding evaluation of the surgical margins.     For  purposes, this case was reviewed by a second pathologist who concur

## 2020-12-11 NOTE — TELEPHONE ENCOUNTER
Last Procedure, Date, MD:  11/14/2019, Colonoscopy, Dr. Terrance Haley  Last Diagnosis:  Tubulovillous adenoma  Recalled for (mth/yrs): 1 year  Sedation used previously: MAC  Last Prep Used (if known): Trilyte  Quality of prep (if known): Excellent  Anticoagulants

## 2020-12-11 NOTE — TELEPHONE ENCOUNTER
Patient calling to schedule his yearly colonoscopy,informed of the 72 hour call back. Please contact at:[phone],thanks.

## 2020-12-12 NOTE — TELEPHONE ENCOUNTER
Thanks all.       GI schedulers –    Please schedule colonoscopy exam at [facility]    BMI Readings from Last 1 Encounters:  06/23/20 : 28.70 kg/m²     MAC anesthesia     Golytely (PEG) 4L bowel prep  Rx sent in to [facility]

## 2020-12-15 NOTE — TELEPHONE ENCOUNTER
Scheduled for:  Colonoscopy [address]  Provider Name:  Dr. Greg Rollins  Date:  01/13/2021  Location:  [facility]  Sedation:  MAC  Time:  1:45pm, (pt is aware [facility] will call with arrival time)    Prep:  GoLytely  Meds/Allergies Reconciled?:  Physician reviewed      Stephanie Bowen

## 2021-01-10 ENCOUNTER — LAB REQUISITION (OUTPATIENT)
Dept: LAB | Facility: HOSPITAL | Age: 56
End: 2021-01-10
Payer: COMMERCIAL

## 2021-01-10 DIAGNOSIS — Z01.818 ENCOUNTER FOR OTHER PREPROCEDURAL EXAMINATION: ICD-10-CM

## 2021-01-11 LAB — SARS-COV-2 RNA RESP QL NAA+PROBE: NOT DETECTED

## 2021-01-13 ENCOUNTER — SURGERY CENTER DOCUMENTATION (OUTPATIENT)
Dept: SURGERY | Age: 56
End: 2021-01-13

## 2021-01-13 ENCOUNTER — LAB REQUISITION (OUTPATIENT)
Dept: LAB | Facility: HOSPITAL | Age: 56
End: 2021-01-13
Payer: COMMERCIAL

## 2021-01-13 DIAGNOSIS — Z85.038 PERSONAL HISTORY OF OTHER MALIGNANT NEOPLASM OF LARGE INTESTINE: ICD-10-CM

## 2021-01-13 PROCEDURE — 88305 TISSUE EXAM BY PATHOLOGIST: CPT | Performed by: INTERNAL MEDICINE

## 2021-01-13 NOTE — PROCEDURES
[facility] OUTPATIENT SURGERY CENTER      COLONOSCOPY PROCEDURE REPORT     DATE OF PROCEDURE:  1/13/2021     PCP: Akila Brown DO     PREOPERATIVE DIAGNOSIS: Personal history colon cancer     POSTOPERATIVE DIAGNOSIS:  See impression.      SURGEON:  Alcides

## 2021-02-01 ENCOUNTER — TELEPHONE (OUTPATIENT)
Dept: GASTROENTEROLOGY | Facility: CLINIC | Age: 56
End: 2021-02-01

## 2021-02-01 NOTE — TELEPHONE ENCOUNTER
Results letter mailed to patient per   Colon recall entered for repeat in 3 yrs,1/13/2024  Colon done in 1/13/2021  HM Updated and Patient Outreach was placed for Colon recall.   Iris Tirado MD  P Em Gi Clinical Staff             GI R

## 2021-06-24 ENCOUNTER — OFFICE VISIT (OUTPATIENT)
Dept: HEMATOLOGY/ONCOLOGY | Facility: HOSPITAL | Age: 56
End: 2021-06-24
Attending: INTERNAL MEDICINE
Payer: COMMERCIAL

## 2021-06-24 VITALS
OXYGEN SATURATION: 97 % | HEIGHT: 67.5 IN | SYSTOLIC BLOOD PRESSURE: 133 MMHG | WEIGHT: 188 LBS | DIASTOLIC BLOOD PRESSURE: 91 MMHG | RESPIRATION RATE: 16 BRPM | BODY MASS INDEX: 29.16 KG/M2 | HEART RATE: 64 BPM | TEMPERATURE: 99 F

## 2021-06-24 DIAGNOSIS — Z85.048 PERSONAL HISTORY OF RECTAL CANCER: Primary | ICD-10-CM

## 2021-06-24 DIAGNOSIS — Z08 ENCOUNTER FOR FOLLOW-UP SURVEILLANCE OF RECTAL CANCER: ICD-10-CM

## 2021-06-24 DIAGNOSIS — Z85.048 ENCOUNTER FOR FOLLOW-UP SURVEILLANCE OF RECTAL CANCER: ICD-10-CM

## 2021-06-24 PROCEDURE — 99213 OFFICE O/P EST LOW 20 MIN: CPT | Performed by: INTERNAL MEDICINE

## 2021-06-24 NOTE — PROGRESS NOTES
HPI     Paul Crowe is a 55year old male who is here today for evaluation of diagnosed Personal history of rectal cancer  (primary encounter diagnosis)  Encounter for follow-up surveillance of rectal cancer.     Patient status post hand-assisted laparos Left 09/19/2019   • LAP, SURG; COLECTOMY, PARTIAL, W/ANASTOMOSIS, W/COLOPROCTOSTOMY  12/10/2019     Social History    Tobacco Use      Smoking status: Never Smoker      Smokeless tobacco: Never Used    Vaping Use      Vaping Use: Never used    Alcohol use: muscle in sample. No further treatment recommended. Surveillance:  1 yr post op colonoscopy. If advanced adenomatous polyp repeat in 1 yr, if none, repeat in 3 yrs then every 5 yrs. Colonoscopy due in January 2024. No need for CEA.   No

## 2021-07-02 ENCOUNTER — OFFICE VISIT (OUTPATIENT)
Dept: FAMILY MEDICINE CLINIC | Facility: CLINIC | Age: 56
End: 2021-07-02
Payer: COMMERCIAL

## 2021-07-02 VITALS
DIASTOLIC BLOOD PRESSURE: 77 MMHG | WEIGHT: 189 LBS | SYSTOLIC BLOOD PRESSURE: 133 MMHG | BODY MASS INDEX: 29.32 KG/M2 | HEART RATE: 71 BPM | HEIGHT: 67.5 IN

## 2021-07-02 DIAGNOSIS — C20 MALIGNANT NEOPLASM OF RECTUM (HCC): ICD-10-CM

## 2021-07-02 DIAGNOSIS — C18.7 MALIGNANT NEOPLASM OF SIGMOID COLON (HCC): ICD-10-CM

## 2021-07-02 DIAGNOSIS — Z00.00 ROUTINE GENERAL MEDICAL EXAMINATION AT A HEALTH CARE FACILITY: Primary | ICD-10-CM

## 2021-07-02 PROCEDURE — 3078F DIAST BP <80 MM HG: CPT | Performed by: FAMILY MEDICINE

## 2021-07-02 PROCEDURE — 3075F SYST BP GE 130 - 139MM HG: CPT | Performed by: FAMILY MEDICINE

## 2021-07-02 PROCEDURE — 99396 PREV VISIT EST AGE 40-64: CPT | Performed by: FAMILY MEDICINE

## 2021-07-02 PROCEDURE — 3008F BODY MASS INDEX DOCD: CPT | Performed by: FAMILY MEDICINE

## 2021-07-02 NOTE — PROGRESS NOTES
HPI/Subjective:   Patient ID: Hu Portillo is a 55year old male. HPI  Patient presents with:  Routine Physical: annual physical     History/Other:   Review of Systems   Constitutional: Negative. HENT: Negative. Eyes: Negative.     Respiratory: Neg back: Normal range of motion and neck supple. Lymphadenopathy:      Cervical: No cervical adenopathy. Skin:     Comments: No suspicious lesions waist up exam   Neurological:      Mental Status: He is alert and oriented to person, place, and time.

## 2021-07-05 NOTE — TELEPHONE ENCOUNTER
Problem: Fluid Volume - Imbalance:  Goal: Absence of imbalanced fluid volume signs and symptoms  7/5/2021 0746 by Randy Llanes RN  Outcome: Ongoing  7/4/2021 2345 by Nanette Thapa RN  Outcome: Ongoing     Problem: Fluid Volume - Imbalance:  Goal: Absence of postpartum hemorrhage signs and symptoms  7/5/2021 0746 by Randy Llanes RN  Outcome: Ongoing  7/4/2021 2345 by Nanette Thapa RN  Outcome: Ongoing     Problem: Pain - Acute:  Goal: Pain level will decrease  7/5/2021 0746 by Randy Llanes RN  Outcome: Ongoing  7/4/2021 2345 by Nanette Thapa RN  Outcome: Ongoing     Problem: Discharge Planning:  Goal: Discharged to appropriate level of care  7/5/2021 0746 by Randy Llanes RN  Outcome: Ongoing  7/4/2021 2345 by Nanette Thapa RN  Outcome: Ongoing     Problem: Constipation:  Goal: Bowel elimination is within specified parameters  7/5/2021 0746 by Randy Llanes RN  Outcome: Ongoing  7/4/2021 2345 by Nanette Thapa RN  Outcome: Ongoing     Problem: Infection - Risk of, Puerperal Infection:  Goal: Will show no infection signs and symptoms  7/5/2021 0746 by Randy Llanes RN  Outcome: Ongoing  7/4/2021 2345 by Nanette Thapa RN  Outcome: Ongoing     Problem: Mood - Altered:  Goal: Mood stable  7/5/2021 0746 by Randy Llanes RN  Outcome: Ongoing  7/4/2021 2345 by Nanette Thapa RN  Outcome: Ongoing Spoke to pt from nurse triage. Apt booked Tuesday at 10 am for OV- advised pt to use use [place] and warm compresses.

## 2021-07-12 ENCOUNTER — LAB ENCOUNTER (OUTPATIENT)
Dept: LAB | Age: 56
End: 2021-07-12
Attending: FAMILY MEDICINE
Payer: COMMERCIAL

## 2021-07-12 DIAGNOSIS — Z00.00 ROUTINE GENERAL MEDICAL EXAMINATION AT A HEALTH CARE FACILITY: ICD-10-CM

## 2021-07-12 LAB
ALBUMIN SERPL-MCNC: 3.7 G/DL (ref 3.4–5)
ALBUMIN/GLOB SERPL: 1.1 {RATIO} (ref 1–2)
ALP LIVER SERPL-CCNC: 53 U/L
ALT SERPL-CCNC: 39 U/L
ANION GAP SERPL CALC-SCNC: 7 MMOL/L (ref 0–18)
AST SERPL-CCNC: 15 U/L (ref 15–37)
BASOPHILS # BLD AUTO: 0.03 X10(3) UL (ref 0–0.2)
BASOPHILS NFR BLD AUTO: 0.6 %
BILIRUB SERPL-MCNC: 1.5 MG/DL (ref 0.1–2)
BUN BLD-MCNC: 27 MG/DL (ref 7–18)
BUN/CREAT SERPL: 25.2 (ref 10–20)
CALCIUM BLD-MCNC: 8.9 MG/DL (ref 8.5–10.1)
CHLORIDE SERPL-SCNC: 110 MMOL/L (ref 98–112)
CHOLEST SMN-MCNC: 193 MG/DL (ref ?–200)
CO2 SERPL-SCNC: 25 MMOL/L (ref 21–32)
COMPLEXED PSA SERPL-MCNC: 0.56 NG/ML (ref ?–4)
CREAT BLD-MCNC: 1.07 MG/DL
DEPRECATED RDW RBC AUTO: 40.7 FL (ref 35.1–46.3)
EOSINOPHIL # BLD AUTO: 0.17 X10(3) UL (ref 0–0.7)
EOSINOPHIL NFR BLD AUTO: 3.3 %
ERYTHROCYTE [DISTWIDTH] IN BLOOD BY AUTOMATED COUNT: 12.4 % (ref 11–15)
GLOBULIN PLAS-MCNC: 3.5 G/DL (ref 2.8–4.4)
GLUCOSE BLD-MCNC: 98 MG/DL (ref 70–99)
HCT VFR BLD AUTO: 46.3 %
HDLC SERPL-MCNC: 40 MG/DL (ref 40–59)
HGB BLD-MCNC: 16 G/DL
IMM GRANULOCYTES # BLD AUTO: 0.01 X10(3) UL (ref 0–1)
IMM GRANULOCYTES NFR BLD: 0.2 %
LDLC SERPL CALC-MCNC: 137 MG/DL (ref ?–100)
LYMPHOCYTES # BLD AUTO: 1.15 X10(3) UL (ref 1–4)
LYMPHOCYTES NFR BLD AUTO: 22.4 %
M PROTEIN MFR SERPL ELPH: 7.2 G/DL (ref 6.4–8.2)
MCH RBC QN AUTO: 31.4 PG (ref 26–34)
MCHC RBC AUTO-ENTMCNC: 34.6 G/DL (ref 31–37)
MCV RBC AUTO: 90.8 FL
MONOCYTES # BLD AUTO: 0.48 X10(3) UL (ref 0.1–1)
MONOCYTES NFR BLD AUTO: 9.3 %
NEUTROPHILS # BLD AUTO: 3.3 X10 (3) UL (ref 1.5–7.7)
NEUTROPHILS # BLD AUTO: 3.3 X10(3) UL (ref 1.5–7.7)
NEUTROPHILS NFR BLD AUTO: 64.2 %
NONHDLC SERPL-MCNC: 153 MG/DL (ref ?–130)
OSMOLALITY SERPL CALC.SUM OF ELEC: 299 MOSM/KG (ref 275–295)
PATIENT FASTING Y/N/NP: YES
PATIENT FASTING Y/N/NP: YES
PLATELET # BLD AUTO: 210 10(3)UL (ref 150–450)
POTASSIUM SERPL-SCNC: 4 MMOL/L (ref 3.5–5.1)
RBC # BLD AUTO: 5.1 X10(6)UL
SODIUM SERPL-SCNC: 142 MMOL/L (ref 136–145)
TRIGL SERPL-MCNC: 85 MG/DL (ref 30–149)
VLDLC SERPL CALC-MCNC: 16 MG/DL (ref 0–30)
WBC # BLD AUTO: 5.1 X10(3) UL (ref 4–11)

## 2021-07-12 PROCEDURE — 85025 COMPLETE CBC W/AUTO DIFF WBC: CPT

## 2021-07-12 PROCEDURE — 80061 LIPID PANEL: CPT

## 2021-07-12 PROCEDURE — 36415 COLL VENOUS BLD VENIPUNCTURE: CPT

## 2021-07-12 PROCEDURE — 80053 COMPREHEN METABOLIC PANEL: CPT

## 2022-06-23 ENCOUNTER — OFFICE VISIT (OUTPATIENT)
Dept: HEMATOLOGY/ONCOLOGY | Facility: HOSPITAL | Age: 57
End: 2022-06-23
Attending: NURSE PRACTITIONER
Payer: COMMERCIAL

## 2022-06-23 VITALS
HEART RATE: 76 BPM | OXYGEN SATURATION: 97 % | DIASTOLIC BLOOD PRESSURE: 77 MMHG | SYSTOLIC BLOOD PRESSURE: 121 MMHG | HEIGHT: 67.5 IN | RESPIRATION RATE: 16 BRPM | WEIGHT: 193 LBS | TEMPERATURE: 99 F | BODY MASS INDEX: 29.94 KG/M2

## 2022-06-23 DIAGNOSIS — Z85.048 ENCOUNTER FOR FOLLOW-UP SURVEILLANCE OF RECTAL CANCER: ICD-10-CM

## 2022-06-23 DIAGNOSIS — Z08 ENCOUNTER FOR FOLLOW-UP SURVEILLANCE OF RECTAL CANCER: ICD-10-CM

## 2022-06-23 DIAGNOSIS — C20 RECTAL CANCER (HCC): Primary | ICD-10-CM

## 2022-06-23 PROCEDURE — 99213 OFFICE O/P EST LOW 20 MIN: CPT | Performed by: NURSE PRACTITIONER

## 2023-03-20 NOTE — TELEPHONE ENCOUNTER
From: Parish Olmstead  To: Conor Larkin  Sent: 3/19/2023 4:50 PM CDT  Subject: Pain in lower back and follow up year appointment     Hello, it has been a minute. Having some discomfort in my lower back not sure what is happening. Zingers and feels tight. Also Aug one year will be here and I think I should schedule an appointment.  Thanks  Denita   Routed to office on call per patient request. Patient requesting another doctor review his most recent CT results.      Per TE from 11/15/19 patient's CLN path results revealed:    Brooke Tom MD      4:51 PM   Note      Dr Blaine Bernal called me wi

## 2023-06-22 ENCOUNTER — OFFICE VISIT (OUTPATIENT)
Dept: HEMATOLOGY/ONCOLOGY | Facility: HOSPITAL | Age: 58
End: 2023-06-22
Attending: NURSE PRACTITIONER
Payer: COMMERCIAL

## 2023-06-22 ENCOUNTER — TELEPHONE (OUTPATIENT)
Facility: CLINIC | Age: 58
End: 2023-06-22

## 2023-06-22 VITALS
WEIGHT: 191 LBS | SYSTOLIC BLOOD PRESSURE: 133 MMHG | OXYGEN SATURATION: 95 % | HEIGHT: 67.5 IN | TEMPERATURE: 99 F | BODY MASS INDEX: 29.63 KG/M2 | HEART RATE: 63 BPM | DIASTOLIC BLOOD PRESSURE: 91 MMHG | RESPIRATION RATE: 16 BRPM

## 2023-06-22 DIAGNOSIS — Z85.048 PERSONAL HISTORY OF RECTAL CANCER: ICD-10-CM

## 2023-06-22 DIAGNOSIS — Z08 ENCOUNTER FOR FOLLOW-UP SURVEILLANCE OF RECTAL CANCER: Primary | ICD-10-CM

## 2023-06-22 DIAGNOSIS — Z85.048 ENCOUNTER FOR FOLLOW-UP SURVEILLANCE OF RECTAL CANCER: Primary | ICD-10-CM

## 2023-06-22 PROCEDURE — 99213 OFFICE O/P EST LOW 20 MIN: CPT | Performed by: INTERNAL MEDICINE

## 2023-06-22 NOTE — TELEPHONE ENCOUNTER
Patient states that his oncologist suggested for him to call our office to schedule his procedure, since things have been backed up. Patients last procedure done 2/1/2021.

## 2023-06-26 RX ORDER — SODIUM, POTASSIUM,MAG SULFATES 17.5-3.13G
SOLUTION, RECONSTITUTED, ORAL ORAL
Qty: 1 EACH | Refills: 0 | Status: SHIPPED | OUTPATIENT
Start: 2023-06-26

## 2023-08-07 ENCOUNTER — OFFICE VISIT (OUTPATIENT)
Dept: FAMILY MEDICINE CLINIC | Facility: CLINIC | Age: 58
End: 2023-08-07

## 2023-08-07 VITALS
OXYGEN SATURATION: 94 % | DIASTOLIC BLOOD PRESSURE: 84 MMHG | TEMPERATURE: 98 F | WEIGHT: 187.63 LBS | HEIGHT: 67.5 IN | BODY MASS INDEX: 29.11 KG/M2 | SYSTOLIC BLOOD PRESSURE: 131 MMHG | HEART RATE: 53 BPM

## 2023-08-07 DIAGNOSIS — Z00.00 ROUTINE GENERAL MEDICAL EXAMINATION AT A HEALTH CARE FACILITY: Primary | ICD-10-CM

## 2023-08-07 PROCEDURE — 3008F BODY MASS INDEX DOCD: CPT | Performed by: FAMILY MEDICINE

## 2023-08-07 PROCEDURE — 3075F SYST BP GE 130 - 139MM HG: CPT | Performed by: FAMILY MEDICINE

## 2023-08-07 PROCEDURE — 3079F DIAST BP 80-89 MM HG: CPT | Performed by: FAMILY MEDICINE

## 2023-08-07 PROCEDURE — 99396 PREV VISIT EST AGE 40-64: CPT | Performed by: FAMILY MEDICINE

## 2023-08-08 NOTE — PROGRESS NOTES
Subjective:   Patient ID: Yaneth Niño is a 63year old male. Keturah Bruno .Patient presents with:  Routine Physical: Annual physical        History/Other:   Review of Systems   Constitutional: Negative. HENT: Negative. Eyes: Negative. Respiratory: Negative. Cardiovascular: Negative. Gastrointestinal: Negative. Endocrine: Negative for polydipsia, polyphagia and polyuria. Genitourinary: Negative. Musculoskeletal: Negative. Skin: Negative. No mole changes   Allergic/Immunologic: Negative for environmental allergies. Neurological:  Negative for dizziness, weakness, numbness and headaches. Hematological: Negative. Psychiatric/Behavioral:  Negative for sleep disturbance. No anxiety or depressed feelings   Current Outpatient Medications   Medication Sig Dispense Refill    Na Sulfate-K Sulfate-Mg Sulf (SUPREP BOWEL PREP KIT) 17.5-3.13-1.6 GM/177ML Oral Solution Take as directed 1 each 0     Allergies:No Known Allergies    Objective:   Physical Exam  Constitutional:       Appearance: Normal appearance. He is well-developed. HENT:      Head: Normocephalic. Right Ear: Tympanic membrane, ear canal and external ear normal.      Left Ear: Tympanic membrane, ear canal and external ear normal.      Nose: Nose normal.   Eyes:      General: Lids are normal.      Conjunctiva/sclera: Conjunctivae normal.      Pupils: Pupils are equal, round, and reactive to light. Funduscopic exam:     Right eye: No hemorrhage or papilledema. Left eye: No hemorrhage or papilledema. Neck:      Vascular: Normal carotid pulses. No JVD. Trachea: Trachea normal.   Cardiovascular:      Rate and Rhythm: Regular rhythm. Pulses:           Carotid pulses are 2+ on the right side and 2+ on the left side. Radial pulses are 2+ on the right side and 2+ on the left side. Heart sounds: Normal heart sounds. Pulmonary:      Breath sounds: Normal breath sounds.    Abdominal: Tenderness: There is no abdominal tenderness. Musculoskeletal:      Cervical back: Normal, normal range of motion and neck supple. Thoracic back: Normal.      Lumbar back: Normal.   Lymphadenopathy:      Cervical: No cervical adenopathy. Skin:     Comments: No suspicious lesions waist up exam   Neurological:      General: No focal deficit present. Mental Status: He is alert and oriented to person, place, and time. Sensory: No sensory deficit. Deep Tendon Reflexes: Reflexes are normal and symmetric. Psychiatric:         Mood and Affect: Mood normal. Mood is not anxious or depressed. Assessment & Plan:   Routine general medical examination at a health care facility  (primary encounter diagnosis)    Discussed weight loss through diet and exercise. Follow up with his gastroenterologis. t     Orders Placed This Encounter      CBC With Differential With Platelet      Comp Metabolic Panel (14)      Lipid Panel      PSA Total, Screen      Urinalysis, Routine      Meds This Visit:  Requested Prescriptions      No prescriptions requested or ordered in this encounter       Imaging & Referrals:  None

## 2023-08-31 ENCOUNTER — OFFICE VISIT (OUTPATIENT)
Dept: DERMATOLOGY CLINIC | Facility: CLINIC | Age: 58
End: 2023-08-31

## 2023-08-31 DIAGNOSIS — L81.4 LENTIGINES: ICD-10-CM

## 2023-08-31 DIAGNOSIS — Z85.828 HISTORY OF NONMELANOMA SKIN CANCER: ICD-10-CM

## 2023-08-31 DIAGNOSIS — L82.1 SEBORRHEIC KERATOSES: Primary | ICD-10-CM

## 2023-08-31 DIAGNOSIS — D22.9 MULTIPLE BENIGN NEVI: ICD-10-CM

## 2023-08-31 DIAGNOSIS — D18.01 CHERRY ANGIOMA: ICD-10-CM

## 2023-09-26 NOTE — PROGRESS NOTES
[facility]    Progress Note    Dala Shadow Patient Status:  Inpatient    5/15/1965 MRN [id number]   Location [facility] 4W/SW/SE Attending Nickie Conklin MD   Hosp Day # 3 PCP Lela Argueta DO     Assessment and Plan: 12/15/2017                     Melinda Graham MD  12/13/2019 Isotretinoin Counseling: Patient should get monthly blood tests, not donate blood, not drive at night if vision affected, not share medication, and not undergo elective surgery for 6 months after tx completed. Side effects reviewed, pt to contact office should one occur.

## 2024-01-15 ENCOUNTER — LAB ENCOUNTER (OUTPATIENT)
Dept: LAB | Facility: HOSPITAL | Age: 59
End: 2024-01-15
Attending: INTERNAL MEDICINE
Payer: COMMERCIAL

## 2024-01-15 DIAGNOSIS — Z01.818 PRE-OP TESTING: ICD-10-CM

## 2024-01-15 DIAGNOSIS — Z00.00 ROUTINE GENERAL MEDICAL EXAMINATION AT A HEALTH CARE FACILITY: ICD-10-CM

## 2024-01-15 LAB
ALBUMIN SERPL-MCNC: 4.5 G/DL (ref 3.2–4.8)
ALBUMIN/GLOB SERPL: 1.7 {RATIO} (ref 1–2)
ALP LIVER SERPL-CCNC: 65 U/L
ALT SERPL-CCNC: 26 U/L
ANION GAP SERPL CALC-SCNC: 3 MMOL/L (ref 0–18)
AST SERPL-CCNC: 19 U/L (ref ?–34)
BASOPHILS # BLD AUTO: 0.05 X10(3) UL (ref 0–0.2)
BASOPHILS NFR BLD AUTO: 0.7 %
BILIRUB SERPL-MCNC: 1.6 MG/DL (ref 0.3–1.2)
BILIRUB UR QL: NEGATIVE
BUN BLD-MCNC: 20 MG/DL (ref 9–23)
BUN/CREAT SERPL: 16.9 (ref 10–20)
CALCIUM BLD-MCNC: 9.7 MG/DL (ref 8.7–10.4)
CHLORIDE SERPL-SCNC: 105 MMOL/L (ref 98–112)
CHOLEST SERPL-MCNC: 158 MG/DL (ref ?–200)
CLARITY UR: CLEAR
CO2 SERPL-SCNC: 30 MMOL/L (ref 21–32)
COMPLEXED PSA SERPL-MCNC: 0.49 NG/ML (ref ?–4)
CREAT BLD-MCNC: 1.18 MG/DL
DEPRECATED RDW RBC AUTO: 40.7 FL (ref 35.1–46.3)
EGFRCR SERPLBLD CKD-EPI 2021: 72 ML/MIN/1.73M2 (ref 60–?)
EOSINOPHIL # BLD AUTO: 0.22 X10(3) UL (ref 0–0.7)
EOSINOPHIL NFR BLD AUTO: 3.2 %
ERYTHROCYTE [DISTWIDTH] IN BLOOD BY AUTOMATED COUNT: 12.4 % (ref 11–15)
FASTING PATIENT LIPID ANSWER: YES
FASTING STATUS PATIENT QL REPORTED: YES
GLOBULIN PLAS-MCNC: 2.6 G/DL (ref 2.8–4.4)
GLUCOSE BLD-MCNC: 88 MG/DL (ref 70–99)
GLUCOSE UR-MCNC: NORMAL MG/DL
HCT VFR BLD AUTO: 48.5 %
HDLC SERPL-MCNC: 37 MG/DL (ref 40–59)
HGB BLD-MCNC: 17.1 G/DL
HGB UR QL STRIP.AUTO: NEGATIVE
IMM GRANULOCYTES # BLD AUTO: 0.02 X10(3) UL (ref 0–1)
IMM GRANULOCYTES NFR BLD: 0.3 %
KETONES UR-MCNC: NEGATIVE MG/DL
LDLC SERPL CALC-MCNC: 105 MG/DL (ref ?–100)
LEUKOCYTE ESTERASE UR QL STRIP.AUTO: NEGATIVE
LYMPHOCYTES # BLD AUTO: 1.28 X10(3) UL (ref 1–4)
LYMPHOCYTES NFR BLD AUTO: 18.6 %
MCH RBC QN AUTO: 31.7 PG (ref 26–34)
MCHC RBC AUTO-ENTMCNC: 35.3 G/DL (ref 31–37)
MCV RBC AUTO: 89.8 FL
MONOCYTES # BLD AUTO: 0.49 X10(3) UL (ref 0.1–1)
MONOCYTES NFR BLD AUTO: 7.1 %
NEUTROPHILS # BLD AUTO: 4.81 X10 (3) UL (ref 1.5–7.7)
NEUTROPHILS # BLD AUTO: 4.81 X10(3) UL (ref 1.5–7.7)
NEUTROPHILS NFR BLD AUTO: 70.1 %
NITRITE UR QL STRIP.AUTO: NEGATIVE
NONHDLC SERPL-MCNC: 121 MG/DL (ref ?–130)
OSMOLALITY SERPL CALC.SUM OF ELEC: 288 MOSM/KG (ref 275–295)
PH UR: 5 [PH] (ref 5–8)
PLATELET # BLD AUTO: 209 10(3)UL (ref 150–450)
POTASSIUM SERPL-SCNC: 3.9 MMOL/L (ref 3.5–5.1)
PROT SERPL-MCNC: 7.1 G/DL (ref 5.7–8.2)
PROT UR-MCNC: NEGATIVE MG/DL
RBC # BLD AUTO: 5.4 X10(6)UL
SARS-COV-2 RNA RESP QL NAA+PROBE: NOT DETECTED
SODIUM SERPL-SCNC: 138 MMOL/L (ref 136–145)
SP GR UR STRIP: 1.02 (ref 1–1.03)
TRIGL SERPL-MCNC: 81 MG/DL (ref 30–149)
UROBILINOGEN UR STRIP-ACNC: NORMAL
VLDLC SERPL CALC-MCNC: 14 MG/DL (ref 0–30)
WBC # BLD AUTO: 6.9 X10(3) UL (ref 4–11)

## 2024-01-15 PROCEDURE — 80053 COMPREHEN METABOLIC PANEL: CPT

## 2024-01-15 PROCEDURE — 85025 COMPLETE CBC W/AUTO DIFF WBC: CPT

## 2024-01-15 PROCEDURE — 36415 COLL VENOUS BLD VENIPUNCTURE: CPT

## 2024-01-15 PROCEDURE — 81003 URINALYSIS AUTO W/O SCOPE: CPT

## 2024-01-15 PROCEDURE — 80061 LIPID PANEL: CPT

## 2024-01-16 ENCOUNTER — HOSPITAL ENCOUNTER (OUTPATIENT)
Facility: HOSPITAL | Age: 59
Setting detail: HOSPITAL OUTPATIENT SURGERY
Discharge: HOME | End: 2024-01-16
Attending: INTERNAL MEDICINE | Admitting: INTERNAL MEDICINE
Payer: COMMERCIAL

## 2024-01-16 ENCOUNTER — ANESTHESIA EVENT (OUTPATIENT)
Dept: ENDOSCOPY | Facility: HOSPITAL | Age: 59
End: 2024-01-16
Payer: COMMERCIAL

## 2024-01-16 ENCOUNTER — ANESTHESIA (OUTPATIENT)
Dept: ENDOSCOPY | Facility: HOSPITAL | Age: 59
End: 2024-01-16
Payer: COMMERCIAL

## 2024-01-16 VITALS
DIASTOLIC BLOOD PRESSURE: 93 MMHG | WEIGHT: 185 LBS | HEART RATE: 73 BPM | BODY MASS INDEX: 28.04 KG/M2 | SYSTOLIC BLOOD PRESSURE: 130 MMHG | HEIGHT: 68 IN | OXYGEN SATURATION: 90 % | RESPIRATION RATE: 20 BRPM

## 2024-01-16 DIAGNOSIS — Z01.818 PRE-OP TESTING: Primary | ICD-10-CM

## 2024-01-16 DIAGNOSIS — Z85.048 PERSONAL HISTORY OF RECTAL CANCER: ICD-10-CM

## 2024-01-16 PROCEDURE — 45378 DIAGNOSTIC COLONOSCOPY: CPT | Performed by: INTERNAL MEDICINE

## 2024-01-16 PROCEDURE — 0DJD8ZZ INSPECTION OF LOWER INTESTINAL TRACT, VIA NATURAL OR ARTIFICIAL OPENING ENDOSCOPIC: ICD-10-PCS | Performed by: INTERNAL MEDICINE

## 2024-01-16 RX ORDER — SODIUM CHLORIDE, SODIUM LACTATE, POTASSIUM CHLORIDE, CALCIUM CHLORIDE 600; 310; 30; 20 MG/100ML; MG/100ML; MG/100ML; MG/100ML
INJECTION, SOLUTION INTRAVENOUS CONTINUOUS
Status: DISCONTINUED | OUTPATIENT
Start: 2024-01-16 | End: 2024-01-16

## 2024-01-16 RX ORDER — NALOXONE HYDROCHLORIDE 0.4 MG/ML
0.08 INJECTION, SOLUTION INTRAMUSCULAR; INTRAVENOUS; SUBCUTANEOUS ONCE AS NEEDED
Status: DISCONTINUED | OUTPATIENT
Start: 2024-01-16 | End: 2024-01-16

## 2024-01-16 RX ORDER — LIDOCAINE HYDROCHLORIDE 20 MG/ML
INJECTION, SOLUTION EPIDURAL; INFILTRATION; INTRACAUDAL; PERINEURAL AS NEEDED
Status: DISCONTINUED | OUTPATIENT
Start: 2024-01-16 | End: 2024-01-16 | Stop reason: SURG

## 2024-01-16 RX ADMIN — LIDOCAINE HYDROCHLORIDE 40 MG: 20 INJECTION, SOLUTION EPIDURAL; INFILTRATION; INTRACAUDAL; PERINEURAL at 07:50:00

## 2024-01-16 RX ADMIN — SODIUM CHLORIDE, SODIUM LACTATE, POTASSIUM CHLORIDE, CALCIUM CHLORIDE: 600; 310; 30; 20 INJECTION, SOLUTION INTRAVENOUS at 07:47:00

## 2024-01-16 RX ADMIN — SODIUM CHLORIDE, SODIUM LACTATE, POTASSIUM CHLORIDE, CALCIUM CHLORIDE: 600; 310; 30; 20 INJECTION, SOLUTION INTRAVENOUS at 07:40:00

## 2024-01-16 NOTE — H&P
History & Physical Examination    Patient Name: Cesar Viramontes  MRN: [id number]  [facility]: [id number]  YOB: 1965    Diagnosis: Personal history of rectal cancer, personal history sessile serrated adenoma colon polyps personal history of rectal cancer, personal history sessile serrated adenoma colon polyps    PRESENT ILLNESS: Healthy 58-year-old gentleman who returns for follow-up colonoscopy examination after previous diagnosis rectal cancer November 2019.    Medications Prior to Admission   Medication Sig Dispense Refill Last Dose    Na Sulfate-K Sulfate-Mg Sulf (SUPREP BOWEL PREP KIT) 17.5-3.13-1.6 GM/177ML Oral Solution Take as directed 1 each 0      Current Facility-Administered Medications   Medication Dose Route Frequency    lactated ringers infusion   Intravenous Continuous    lactated ringers infusion   Intravenous Continuous    naloxone (Narcan) 0.4 MG/ML injection 0.08 mg  0.08 mg Intravenous Once PRN     Facility-Administered Medications Ordered in Other Encounters   Medication Dose Route Frequency    propofol (Diprivan) 10 MG/ML injection   Intravenous PRN    lidocaine PF (Xylocaine-MPF) 2% injection   Intravenous PRN    propofol (Diprivan) 10 mg/mL infusion premix   Intravenous Continuous PRN       Allergies: No Known Allergies    Past Medical History:   Diagnosis Date    Chalazion of right eye 2011    RUL and RLL, OD    Chalazion right upper eyelid 08/27/2019    Rectal cancer (HCC) 11/2019    Squamous cell carcinoma of skin 2014    right cheek    Visual impairment     glasses     Past Surgical History:   Procedure Laterality Date    CHEMOSURG MOHS 1ST STAGE Right 1994    cheek    COLONOSCOPY  11/2019    clip left from colonoscopy     INGUINAL HERNIA REPAIR Right 1994    INGUINAL HERNIA REPAIR Left 09/19/2019    LAP, SURG; COLECTOMY, PARTIAL, W/ANASTOMOSIS, W/COLOPROCTOSTOMY  12/10/2019     Family History   Problem Relation Age of Onset    Prostate Cancer Father     Hypertension Father      Stroke Father     Heart Disease Father     Breast Cancer Mother     Other (Other) Sister         parathryoid dysfunction    Diabetes Neg     Glaucoma Neg     Macular degeneration Neg      Social History     Tobacco Use    Smoking status: Never    Smokeless tobacco: Never   Substance Use Topics    Alcohol use: Yes     Alcohol/week: 2.0 standard drinks of alcohol     Types: 2 Glasses of wine per week     Comment: 1-2 glasses of wine daily       SYSTEM Check if Review is Normal Check if Physical Exam is Normal If not normal, please explain:   HEENT [ ] [X ]    NECK & BACK [ ] [ ]    HEART [ X ] [ X ]    LUNGS [ X ] [ X ]    ABDOMEN [ X ] [ X ]    UROGENITAL [ ] [ ]    EXTREMITIES [ ] [ ]    OTHER        See Anesthesia documentation    [ x ] I have discussed the risks and benefits and alternatives with the patient/family.  They understand and agree to proceed with plan of care.  [ x ] I have reviewed the History and Physical done within the last 30 days.  Any changes noted above.    Danie Joshi MD  1/16/2024  8:31 AM

## 2024-01-16 NOTE — DISCHARGE INSTRUCTIONS
.  .  .  Notes from Dr. Joshi:    Healthy exam today; NO colon polyps this time.  Great news.  Very small internal hemorrhoids only on today's colonoscopy exam.  Those may cause occasional blood streaks but are not a problem.  If you are raw and irritated back there after all of that diarrhea and wiping, three good options to soothe and heal the irritation include Aquaphor ointment, \"Desitin\" or \"A & D\" diaper ointment, or good old-fashioned Vaseline.  All of these soothe and create a protective barrier so that your skin can heal.  Repeat colonoscopy exam in 5 years.  .  .  .  Home Care Instructions for Colonoscopy and/or Gastroscopy with Sedation    Diet:  - Resume your regular diet as tolerated unless otherwise instructed.  - Start with light meals to minimize bloating.  - Do not drink alcohol today.    Medication:  - If you have questions about resuming your normal medications, please contact your Primary Care Physician.    Activities:  - Take it easy today. Do not return to work today.  - Do not drive today.  - Do not operate any machinery today (including kitchen equipment).    Colonoscopy:  - You may notice some rectal \"spotting\" (a little blood on the toilet tissue) for a day or two after the exam. This is normal.  - If you experience any rectal bleeding (not spotting), persistent tenderness or sharp severe abdominal pains, oral temperature over 100 degrees Fahrenheit, light-headedness or dizziness, or any other problems, contact your doctor.    Gastroscopy:  - You may have a sore throat for 2-3 days following the exam. This is normal. Gargling with warm salt water (1/2 tsp salt to 1 glass warm water) or using throat lozenges will help.  - If you experience any sharp pain in your neck, abdomen or chest, vomiting of blood, oral temperature over 100 degrees Fahrenheit, light-headedness or dizziness, or any other problems, contact your doctor.    **If unable to reach your doctor, please go to the  [facility] Emergency Room**    - Your referring physician will receive a full report of your examination.  - If you do not hear from your doctor's office within two weeks of your biopsy, please call them for your results.    You may be able to see your laboratory results in zandat between 4 and 7 business days.  In some cases, your physician may not have viewed the results before they are released to [place].  If you have questions regarding your results contact the physician who ordered the test/exam by phone or via zandat by choosing \"Ask a Medical Question.\"

## 2024-01-16 NOTE — ANESTHESIA PREPROCEDURE EVALUATION
Anesthesia PreOp Note    HPI:     Cesar Viramontes is a 58 year old male who presents for preoperative consultation requested by: Danie Joshi MD    Date of Surgery: 1/16/2024    Procedure(s):  COLONOSCOPY  Indication: Personal history of rectal cancer    Relevant Problems   No relevant active problems       NPO:  Last Liquid Consumption Date: 01/16/24  Last Liquid Consumption Time: 0300  Last Solid Consumption Date: 01/15/24  Last Solid Consumption Time: 0900  Last Liquid Consumption Date: 01/16/24          History Review:  Patient Active Problem List    Diagnosis Date Noted    Encounter for follow-up surveillance of rectal cancer 06/23/2020    Personal history of rectal cancer 06/23/2020    Rectal cancer (HCC) 11/25/2019    Chalazion right upper eyelid 08/27/2019    Myopia of both eyes with astigmatism and presbyopia 08/27/2019       Past Medical History:   Diagnosis Date    Chalazion of right eye 2011    RUL and RLL, OD    Chalazion right upper eyelid 08/27/2019    Rectal cancer (HCC) 11/2019    Squamous cell carcinoma of skin 2014    right cheek    Visual impairment     glasses       Past Surgical History:   Procedure Laterality Date    CHEMOSURG MOHS 1ST STAGE Right 1994    cheek    COLONOSCOPY  11/2019    clip left from colonoscopy     INGUINAL HERNIA REPAIR Right 1994    INGUINAL HERNIA REPAIR Left 09/19/2019    LAP, SURG; COLECTOMY, PARTIAL, W/ANASTOMOSIS, W/COLOPROCTOSTOMY  12/10/2019       Medications Prior to Admission   Medication Sig Dispense Refill Last Dose    Na Sulfate-K Sulfate-Mg Sulf (SUPREP BOWEL PREP KIT) 17.5-3.13-1.6 GM/177ML Oral Solution Take as directed 1 each 0      Current Facility-Administered Medications Ordered in Epic   Medication Dose Route Frequency Provider Last Rate Last Admin    lactated ringers infusion   Intravenous Continuous Danie Joshi MD        lactated ringers infusion   Intravenous Continuous Danie Joshi MD        naloxone  (Narcan) 0.4 MG/ML injection 0.08 mg  0.08 mg Intravenous Once PRN Danie Joshi MD         No current [facility]-ordered outpatient medications on file.       No Known Allergies    Family History   Problem Relation Age of Onset    Prostate Cancer Father     Hypertension Father     Stroke Father     Heart Disease Father     Breast Cancer Mother     Other (Other) Sister         parathryoid dysfunction    Diabetes Neg     Glaucoma Neg     Macular degeneration Neg      Social History     Socioeconomic History    Marital status: OTHER    Number of children: 4   Occupational History    Occupation: Videofropper, [employer]     Employer: [employer]   Tobacco Use    Smoking status: Never    Smokeless tobacco: Never   Vaping Use    Vaping Use: Never used   Substance and Sexual Activity    Alcohol use: Yes     Alcohol/week: 2.0 standard drinks of alcohol     Types: 2 Glasses of wine per week     Comment: 1-2 glasses of wine daily    Drug use: Yes     Types: Cannabis     Comment: eddibles once a week   Other Topics Concern    Caffeine Concern Yes     Comment: coffee    Pt has a pacemaker No    Pt has a defibrillator No    Reaction to local anesthetic No       Available pre-op labs reviewed.  Lab Results   Component Value Date    WBC 6.9 01/15/2024    RBC 5.40 01/15/2024    HGB 17.1 01/15/2024    HCT 48.5 01/15/2024    MCV 89.8 01/15/2024    MCH 31.7 01/15/2024    MCHC 35.3 01/15/2024    RDW 12.4 01/15/2024    .0 01/15/2024     Lab Results   Component Value Date     01/15/2024    K 3.9 01/15/2024     01/15/2024    CO2 30.0 01/15/2024    BUN 20 01/15/2024    CREATSERUM 1.18 01/15/2024    GLU 88 01/15/2024    CA 9.7 01/15/2024          Vital Signs:  Body mass index is 28.13 kg/m².   height is 1.727 m (5' 8\") and weight is 83.9 kg (185 lb). His blood pressure is 134/95 (abnormal) and his pulse is 73. His respiration is 16 and oxygen saturation is 97%.   Vitals:    01/08/24 1119 01/16/24 0706    BP:  (!) 134/95   Pulse:  73   Resp:  16   SpO2:  97%   Weight: 83.9 kg (185 lb)    Height: 1.727 m (5' 8\")         Anesthesia Evaluation     Patient summary reviewed and Nursing notes reviewed    Airway   Mallampati: I  TM distance: >3 FB  Neck ROM: full  Dental - Dentition appears grossly intact     Pulmonary - negative ROS and normal exam   Cardiovascular - normal exam  (+) hypertension    Neuro/Psych - negative ROS     GI/Hepatic/Renal    (+) bowel prep    Comments: H/o rectal ca s/p surgery    Endo/Other - negative ROS   Abdominal                  Anesthesia Plan:   ASA:  2  Plan:   MAC and general  Plan Comments: Discussed anesthetic risks such as but, not limited to, CVA, MI, dental damage, awareness and aspiration; verbalizes understanding and wishes to proceed.  Informed Consent Plan and Risks Discussed With:  Patient  Discussed plan with:  Attending      I have informed Cesar Viramontes and/or legal guardian or family member of the nature of the anesthetic plan, benefits, risks including possible dental damage if relevant, major complications, and any alternative forms of anesthetic management.   All of the patient's questions were answered to the best of my ability. The patient desires the anesthetic management as planned.  Elly Chilel CRNA  1/16/2024 7:43 AM  Present on Admission:  **None**

## 2024-01-16 NOTE — OPERATIVE REPORT
[facility]    COLONOSCOPY PROCEDURE REPORT     DATE OF PROCEDURE:  1/16/2024     PCP: Khari Robin DO     PREOPERATIVE DIAGNOSIS:  Personal history of rectal cancer, personal history sessile serrated adenoma colon polyps     POSTOPERATIVE DIAGNOSIS:  See impression.     SURGEON:  Danie Joshi M.D.     SEDATION:    MAC anesthesia provided by the Anesthesia Service.  MAC anesthesia requested due to anticipated intolerance of colonoscopy examination under safe doses conscious sedation medications    COLONOSCOPY PROCEDURE:   After the nature and risks of colonoscopy examination under MAC anesthesia were discussed with the patient and all questions answered, informed consent was obtained.  The patient was sedated as above.      Digital rectal exam was performed which showed no masses.  The Olympus pediatric video colonoscope was placed in the patient's rectum and advanced under direct visualization through the entire length of the colon up to the cecum.  Retroflex exam performed up the ascending colon to the hepatic flexure.  The cecum was confirmed by landmarks including appendiceal orifice, cecal trifold, ileocecal valve.  Retroflexion was not performed in the rectum due to postsurgical anatomy.    The quality of the prep was excellent.    Estimated blood loss: none/insignificant       COLONOSCOPY FINDINGS:    Healthy end to end surgical anastomosis proximal rectal vault, photographed.  Small internal hemorrhoids only on a thorough exam with aggressive irrigation washing and suctioning up to the cecum as above.      RECOMMENDATIONS:  High fiber diet.  Repeat colonoscopy examination in 5 years.

## 2024-01-16 NOTE — ANESTHESIA POSTPROCEDURE EVALUATION
Patient: Cesar Viramontes    Procedure Summary       Date: 01/16/24 Room / Location: [facility] ENDOSCOPY 05 / [facility] ENDOSCOPY    Anesthesia Start: 0747 Anesthesia Stop:     Procedure: COLONOSCOPY Diagnosis:       Personal history of rectal cancer      (surgical anastomosis, hemorrhoids)    Surgeons: Danie Joshi MD Anesthesiologist: Elly Chilel CRNA    Anesthesia Type: MAC, general ASA Status: 1            Anesthesia Type: MAC, general    Vitals Value Taken Time   /94 01/16/24 0831   Temp  01/16/24 0832   Pulse 75 01/16/24 0831   Resp 20 01/16/24 0831   SpO2 97 % 01/16/24 0831       [facility] AN Post Evaluation:   Patient Evaluated in Patient location: Endo recovery.  Patient Participation: complete - patient participated  Level of Consciousness: awake and alert  Pain Score: 0  Pain Management: adequate  Airway Patency:patent  Yes    Cardiovascular Status: acceptable  Respiratory Status: acceptable  Postoperative Hydration acceptable      Elly Chilel CRNA  1/16/2024 8:32 AM

## 2024-06-24 ENCOUNTER — OFFICE VISIT (OUTPATIENT)
Dept: HEMATOLOGY/ONCOLOGY | Facility: HOSPITAL | Age: 59
End: 2024-06-24
Attending: INTERNAL MEDICINE

## 2024-06-24 VITALS
TEMPERATURE: 98 F | SYSTOLIC BLOOD PRESSURE: 136 MMHG | OXYGEN SATURATION: 96 % | HEIGHT: 67.5 IN | DIASTOLIC BLOOD PRESSURE: 83 MMHG | BODY MASS INDEX: 30.35 KG/M2 | HEART RATE: 69 BPM | RESPIRATION RATE: 16 BRPM | WEIGHT: 195.63 LBS

## 2024-06-24 DIAGNOSIS — Z85.048 PERSONAL HISTORY OF RECTAL CANCER: Primary | ICD-10-CM

## 2024-06-24 DIAGNOSIS — Z85.048 ENCOUNTER FOR FOLLOW-UP SURVEILLANCE OF RECTAL CANCER: ICD-10-CM

## 2024-06-24 DIAGNOSIS — Z08 ENCOUNTER FOR FOLLOW-UP SURVEILLANCE OF RECTAL CANCER: ICD-10-CM

## 2024-06-24 PROCEDURE — 99213 OFFICE O/P EST LOW 20 MIN: CPT | Performed by: INTERNAL MEDICINE

## 2024-06-24 RX ORDER — MULTIVIT-MIN/IRON/FOLIC ACID/K 18-600-40
CAPSULE ORAL
COMMUNITY
Start: 2024-06-24

## 2024-06-24 RX ORDER — ASPIRIN 81 MG/1
81 TABLET ORAL DAILY
COMMUNITY
Start: 2024-06-24

## 2024-06-24 NOTE — PROGRESS NOTES
HPI     Cesar Viramontes is a 59 year old male who is here today for follow up of   Encounter Diagnoses   Name Primary?    Personal history of rectal cancer Yes    Encounter for follow-up surveillance of rectal cancer    .    Patient status post hand-assisted laparoscopic rectosigmoid resection, (low anterior resection with low pelvic anastomosis), on 12/10/2019.    Patient had colonoscopy on 1/13/2021 and had 2 adenomatous polyps removed.  He has been referred for follow-up in 3 years.  Patient had repeat colonoscopy on 1/16/2024, patient was recommended for repeat colonoscopy in years, this was negative.    Feeling well.      Patient is active, working.      Weight up 4 lbs in 1 year.     BMs regular, daily.  No blood in the stool.       ECOG PS  0       Review of Systems:       Review of Systems   Constitutional:  Negative for appetite change, diaphoresis, fatigue and unexpected weight change.   HENT:           Seasonal allergies.   Respiratory:  Negative for cough and shortness of breath.    Cardiovascular:  Negative for chest pain.   Gastrointestinal:  Negative for abdominal pain, constipation and diarrhea.        No blood in stool   Genitourinary:  Negative for dysuria and frequency.    Musculoskeletal:  Negative for back pain and neck pain.   Neurological:  Negative for dizziness and headaches.   Hematological:  Negative for adenopathy.   Psychiatric/Behavioral:  Negative for sleep disturbance.          Current Outpatient Medications   Medication Sig Dispense Refill    aspirin 81 MG Oral Tab EC Take 1 tablet (81 mg total) by mouth daily.      Vitamin D, Cholecalciferol, 50 MCG (2000 UT) Oral Cap Take by mouth.      Na Sulfate-K Sulfate-Mg Sulf (SUPREP BOWEL PREP KIT) 17.5-3.13-1.6 GM/177ML Oral Solution Take as directed 1 each 0     Allergies:   No Known Allergies    Past Medical History:    Chalazion of right eye    RUL and RLL, OD    Chalazion right upper eyelid    Rectal cancer (HCC)    Squamous cell  carcinoma of skin    right cheek    Visual impairment    glasses     Past Surgical History:   Procedure Laterality Date    [place] mohs 1st stage Right 1994    cheek    Colonoscopy  11/2019    clip left from colonoscopy     Colonoscopy N/A 1/16/2024    Procedure: COLONOSCOPY;  Surgeon: Danie Joshi MD;  Location: [facility] ENDOSCOPY    Inguinal hernia repair Right 1994    Inguinal hernia repair Left 09/19/2019    Lap, surg; colectomy, partial, w/anastomosis, w/coloproctostomy  12/10/2019     Social History     Socioeconomic History    Marital status: OTHER    Number of children: 4   Occupational History    Occupation: [email or web address], [employer]     Employer: [employer]   Tobacco Use    Smoking status: Never    Smokeless tobacco: Never   Vaping Use    Vaping status: Never Used   Substance and Sexual Activity    Alcohol use: Yes     Alcohol/week: 2.0 standard drinks of alcohol     Types: 2 Glasses of wine per week     Comment: 1-2 glasses of wine daily    Drug use: Yes     Types: Cannabis     Comment: eddibles once a week   Other Topics Concern    Caffeine Concern Yes     Comment: coffee    Pt has a pacemaker No    Pt has a defibrillator No    Reaction to local anesthetic No         Family History   Problem Relation Age of Onset    Prostate Cancer Father     Hypertension Father     Stroke Father     Heart Disease Father     Breast Cancer Mother     Other (Other) Sister         parathryoid dysfunction    Diabetes Neg     Glaucoma Neg     Macular degeneration Neg          PHYSICAL EXAM:    /83 (BP Location: Left arm, Patient Position: Sitting, Cuff Size: adult)   Pulse 69   Temp 98.1 °F (36.7 °C) (Oral)   Resp 16   Ht 1.715 m (5' 7.5\")   Wt 88.7 kg (195 lb 9.6 oz)   SpO2 96%   BMI 30.18 kg/m²   Wt Readings from Last 6 Encounters:   06/24/24 88.7 kg (195 lb 9.6 oz)   01/08/24 83.9 kg (185 lb)   08/07/23 85.1 kg (187 lb 9.6 oz)   06/22/23 86.6 kg (191 lb)   06/23/22 87.5 kg (193 lb)    07/02/21 85.7 kg (189 lb)        Physical Exam  General: Patient is alert, not in acute distress.  HEENT: EOMs intact. PERRL.   Neck: No JVD. No palpable lymphadenopathy. Neck is supple.  Lipoma in the back of the neck.   Chest: Clear to auscultation.  Heart: Regular rate and rhythm.   Abdomen: Soft, non tender with good bowel sounds.  Extremities: No edema.  Neurological: Grossly intact.   Lymphatics: There is no palpable lymphadenopathy throughout in the cervical, supraclavicular, axillary, or inguinal regions.  Psych/Depression: nl          ASSESSMENT/PLAN:     Encounter Diagnoses   Name Primary?    Personal history of rectal cancer Yes    Encounter for follow-up surveillance of rectal cancer        Cancer Staging  Rectal cancer (HCC)  Staging form: Colon and Rectum, AJCC 8th Edition  - Clinical: Stage I (cT1, cN0, cM0) - Signed by Sulaiman Fraser MD on 12/23/2019  - Pathologic: pT0, pN0, cM0 - Signed by Sulaiman Fraser MD on 12/23/2019      D/w patient the pathologic staging and that outcomes are excellent.  Will have pathology review polyp to look for muscle in sample.     No further treatment recommended.      Surveillance:  1 yr post op colonoscopy.  If advanced adenomatous polyp repeat in 1 yr, if none, repeat in 3 yrs then every 5 yrs.      Colonoscopy due in January 2029, as most recent colonoscopy January 2024 was [facility]..      No need for CEA.  No further imaging unless symptomatic.    Survivorship clinic appointment in 1 month.    F/u in 12 months, yearly.      All questions answered to the best of my abilities.  Support provided to the patient.      Encouraged to call if issues arise.     MDM low      No orders of the defined types were placed in this encounter.      Results From Past 48 Hours:  No results found for this or any previous visit (from the past 48 hour(s)).    Imaging & Referrals:  None   No orders of the defined types were placed in this encounter.

## 2024-08-19 ENCOUNTER — MED REC SCAN ONLY (OUTPATIENT)
Dept: FAMILY MEDICINE CLINIC | Facility: CLINIC | Age: 59
End: 2024-08-19

## 2024-09-06 ENCOUNTER — OFFICE VISIT (OUTPATIENT)
Dept: FAMILY MEDICINE CLINIC | Facility: CLINIC | Age: 59
End: 2024-09-06

## 2024-09-06 VITALS
RESPIRATION RATE: 16 BRPM | HEIGHT: 67.5 IN | WEIGHT: 189 LBS | DIASTOLIC BLOOD PRESSURE: 82 MMHG | HEART RATE: 64 BPM | TEMPERATURE: 98 F | SYSTOLIC BLOOD PRESSURE: 140 MMHG | BODY MASS INDEX: 29.32 KG/M2

## 2024-09-06 DIAGNOSIS — N52.9 ERECTILE DYSFUNCTION, UNSPECIFIED ERECTILE DYSFUNCTION TYPE: ICD-10-CM

## 2024-09-06 DIAGNOSIS — Z00.00 ROUTINE GENERAL MEDICAL EXAMINATION AT A HEALTH CARE FACILITY: Primary | ICD-10-CM

## 2024-09-06 DIAGNOSIS — R03.0 ELEVATED BP WITHOUT DIAGNOSIS OF HYPERTENSION: ICD-10-CM

## 2024-09-06 PROBLEM — H00.11 CHALAZION RIGHT UPPER EYELID: Status: RESOLVED | Noted: 2019-08-27 | Resolved: 2024-09-06

## 2024-09-06 PROBLEM — R55 VASOVAGAL SYNCOPE: Status: ACTIVE | Noted: 2024-09-06

## 2024-09-06 PROCEDURE — 3077F SYST BP >= 140 MM HG: CPT | Performed by: FAMILY MEDICINE

## 2024-09-06 PROCEDURE — 3008F BODY MASS INDEX DOCD: CPT | Performed by: FAMILY MEDICINE

## 2024-09-06 PROCEDURE — 99396 PREV VISIT EST AGE 40-64: CPT | Performed by: FAMILY MEDICINE

## 2024-09-06 PROCEDURE — 3079F DIAST BP 80-89 MM HG: CPT | Performed by: FAMILY MEDICINE

## 2024-09-06 NOTE — PROGRESS NOTES
Subjective:   Cesar Viramontes is a 59 year old male who presents for Erectile Dysfuntion       History/Other:    Chief Complaint Reviewed and Verified  No Further Nursing Notes to   Review  Tobacco Reviewed  Allergies Reviewed  Medications Reviewed    Problem List Reviewed  Medical History Reviewed  Surgical History   Reviewed  Family History Reviewed  Social History Reviewed         Tobacco:  He has never smoked tobacco.    Current Outpatient Medications   Medication Sig Dispense Refill    aspirin 81 MG Oral Tab EC Take 1 tablet (81 mg total) by mouth daily.      Vitamin D, Cholecalciferol, 50 MCG (2000 UT) Oral Cap Take by mouth.           Review of Systems:  Review of Systems   Constitutional: Negative.    HENT: Negative.     Eyes: Negative.    Respiratory: Negative.     Cardiovascular: Negative.    Gastrointestinal: Negative.    Endocrine: Negative for polydipsia, polyphagia and polyuria.   Genitourinary: Negative.    Musculoskeletal: Negative.    Skin: Negative.         No mole changes   Allergic/Immunologic: Negative for environmental allergies.   Neurological:  Negative for dizziness, weakness, numbness and headaches.   Hematological: Negative.    Psychiatric/Behavioral:  Negative for sleep disturbance.         No anxiety or depressed feelings         Objective:   /82   Pulse 64   Temp 98 °F (36.7 °C) (Temporal)   Resp 16   Ht 5' 7.5\" (1.715 m)   Wt 189 lb (85.7 kg)   BMI 29.16 kg/m²  Estimated body mass index is 29.16 kg/m² as calculated from the following:    Height as of this encounter: 5' 7.5\" (1.715 m).    Weight as of this encounter: 189 lb (85.7 kg).  Physical Exam  Vitals reviewed.   Constitutional:       Appearance: Normal appearance. He is well-developed.   HENT:      Head: Normocephalic.      Right Ear: Tympanic membrane, ear canal and external ear normal.      Left Ear: Tympanic membrane, ear canal and external ear normal.      Nose: Nose normal.   Eyes:      General: Lids are  normal.      Conjunctiva/sclera: Conjunctivae normal.      Pupils: Pupils are equal, round, and reactive to light.      Funduscopic exam:     Right eye: No hemorrhage or papilledema.         Left eye: No hemorrhage or papilledema.   Neck:      Vascular: Normal carotid pulses. No JVD.      Trachea: Trachea normal.   Cardiovascular:      Rate and Rhythm: Regular rhythm.      Pulses:           Carotid pulses are 2+ on the right side and 2+ on the left side.       Radial pulses are 2+ on the right side and 2+ on the left side.      Heart sounds: Normal heart sounds.   Pulmonary:      Breath sounds: Normal breath sounds.   Abdominal:      Tenderness: There is no abdominal tenderness.   Musculoskeletal:      Cervical back: Normal, normal range of motion and neck supple.      Thoracic back: Normal.      Lumbar back: Normal.   Lymphadenopathy:      Cervical: No cervical adenopathy.   Skin:     Comments: No suspicious lesions waist up exam   Neurological:      General: No focal deficit present.      Mental Status: He is alert and oriented to person, place, and time.      Sensory: No sensory deficit.      Deep Tendon Reflexes: Reflexes are normal and symmetric.   Psychiatric:         Mood and Affect: Mood normal. Mood is not anxious or depressed.           Assessment & Plan:   1. Routine general medical examination at a health care facility (Primary)  -     CBC With Differential With Platelet; Future; Expected date: 09/06/2024  -     Comp Metabolic Panel (14); Future; Expected date: 09/06/2024  -     Lipid Panel; Future; Expected date: 09/06/2024  -     Assay, Thyroid Stim Hormone; Future; Expected date: 09/06/2024  -     PSA Total, Screen; Future; Expected date: 09/06/2024  -     Testosterone, Total And Free; Future; Expected date: 09/06/2024  -     Vitamin D; Future; Expected date: 09/06/2024  2. Erectile dysfunction, unspecified erectile dysfunction type  3. Elevated BP without diagnosis of hypertension    Patient was  recently seen in the emergency department to an episode of syncope.  He describes it as sitting with his girlfriend in a bar and then he suddenly started slumping forward and passed out girlfriend states maybe 30 seconds.  Was seen in the emergency department absolutely negative workup since then has felt fine never had an episode like this before in the past.  In addition will do regular annual laboratory testing and he will follow his blood pressure at home which has had episodes of being elevated.  If he still sees elevations on a regular basis he would need to be treated prior to any treatment for ED.  He states this condition actually has been improving      No follow-ups on file.    Khari Robin DO, 9/6/2024, 4:01 PM

## 2024-09-21 ENCOUNTER — LAB ENCOUNTER (OUTPATIENT)
Dept: LAB | Age: 59
End: 2024-09-21
Attending: FAMILY MEDICINE
Payer: COMMERCIAL

## 2024-09-21 DIAGNOSIS — Z00.00 ROUTINE GENERAL MEDICAL EXAMINATION AT A HEALTH CARE FACILITY: ICD-10-CM

## 2024-09-21 LAB
ALBUMIN SERPL-MCNC: 4.6 G/DL (ref 3.2–4.8)
ALBUMIN/GLOB SERPL: 1.6 {RATIO} (ref 1–2)
ALP LIVER SERPL-CCNC: 68 U/L
ALT SERPL-CCNC: 33 U/L
ANION GAP SERPL CALC-SCNC: 8 MMOL/L (ref 0–18)
AST SERPL-CCNC: 23 U/L (ref ?–34)
BASOPHILS # BLD AUTO: 0.05 X10(3) UL (ref 0–0.2)
BASOPHILS NFR BLD AUTO: 0.9 %
BILIRUB SERPL-MCNC: 1.3 MG/DL (ref 0.3–1.2)
BUN BLD-MCNC: 25 MG/DL (ref 9–23)
BUN/CREAT SERPL: 22.3 (ref 10–20)
CALCIUM BLD-MCNC: 9.7 MG/DL (ref 8.7–10.4)
CHLORIDE SERPL-SCNC: 109 MMOL/L (ref 98–112)
CHOLEST SERPL-MCNC: 198 MG/DL (ref ?–200)
CO2 SERPL-SCNC: 25 MMOL/L (ref 21–32)
COMPLEXED PSA SERPL-MCNC: 0.71 NG/ML (ref ?–4)
CREAT BLD-MCNC: 1.12 MG/DL
DEPRECATED RDW RBC AUTO: 41.9 FL (ref 35.1–46.3)
EGFRCR SERPLBLD CKD-EPI 2021: 76 ML/MIN/1.73M2 (ref 60–?)
EOSINOPHIL # BLD AUTO: 0.17 X10(3) UL (ref 0–0.7)
EOSINOPHIL NFR BLD AUTO: 3 %
ERYTHROCYTE [DISTWIDTH] IN BLOOD BY AUTOMATED COUNT: 12.4 % (ref 11–15)
FASTING PATIENT LIPID ANSWER: YES
FASTING STATUS PATIENT QL REPORTED: YES
GLOBULIN PLAS-MCNC: 2.9 G/DL (ref 2–3.5)
GLUCOSE BLD-MCNC: 92 MG/DL (ref 70–99)
HCT VFR BLD AUTO: 49.9 %
HDLC SERPL-MCNC: 38 MG/DL (ref 40–59)
HGB BLD-MCNC: 17.6 G/DL
IMM GRANULOCYTES # BLD AUTO: 0.01 X10(3) UL (ref 0–1)
IMM GRANULOCYTES NFR BLD: 0.2 %
LDLC SERPL CALC-MCNC: 139 MG/DL (ref ?–100)
LYMPHOCYTES # BLD AUTO: 1.14 X10(3) UL (ref 1–4)
LYMPHOCYTES NFR BLD AUTO: 20.2 %
MCH RBC QN AUTO: 32.4 PG (ref 26–34)
MCHC RBC AUTO-ENTMCNC: 35.3 G/DL (ref 31–37)
MCV RBC AUTO: 91.7 FL
MONOCYTES # BLD AUTO: 0.42 X10(3) UL (ref 0.1–1)
MONOCYTES NFR BLD AUTO: 7.4 %
NEUTROPHILS # BLD AUTO: 3.86 X10 (3) UL (ref 1.5–7.7)
NEUTROPHILS # BLD AUTO: 3.86 X10(3) UL (ref 1.5–7.7)
NEUTROPHILS NFR BLD AUTO: 68.3 %
NONHDLC SERPL-MCNC: 160 MG/DL (ref ?–130)
OSMOLALITY SERPL CALC.SUM OF ELEC: 298 MOSM/KG (ref 275–295)
PLATELET # BLD AUTO: 193 10(3)UL (ref 150–450)
POTASSIUM SERPL-SCNC: 4.5 MMOL/L (ref 3.5–5.1)
PROT SERPL-MCNC: 7.5 G/DL (ref 5.7–8.2)
RBC # BLD AUTO: 5.44 X10(6)UL
SODIUM SERPL-SCNC: 142 MMOL/L (ref 136–145)
TRIGL SERPL-MCNC: 118 MG/DL (ref 30–149)
TSI SER-ACNC: 1.61 MIU/ML (ref 0.55–4.78)
VIT D+METAB SERPL-MCNC: 33.4 NG/ML (ref 30–100)
VLDLC SERPL CALC-MCNC: 22 MG/DL (ref 0–30)
WBC # BLD AUTO: 5.7 X10(3) UL (ref 4–11)

## 2024-09-21 PROCEDURE — 82306 VITAMIN D 25 HYDROXY: CPT

## 2024-09-21 PROCEDURE — 85025 COMPLETE CBC W/AUTO DIFF WBC: CPT

## 2024-09-21 PROCEDURE — 36415 COLL VENOUS BLD VENIPUNCTURE: CPT

## 2024-09-21 PROCEDURE — 80053 COMPREHEN METABOLIC PANEL: CPT

## 2024-09-21 PROCEDURE — 80061 LIPID PANEL: CPT

## 2024-09-21 PROCEDURE — 84403 ASSAY OF TOTAL TESTOSTERONE: CPT

## 2024-09-21 PROCEDURE — 84402 ASSAY OF FREE TESTOSTERONE: CPT

## 2024-09-21 PROCEDURE — 84443 ASSAY THYROID STIM HORMONE: CPT

## 2024-09-26 ENCOUNTER — PATIENT MESSAGE (OUTPATIENT)
Dept: FAMILY MEDICINE CLINIC | Facility: CLINIC | Age: 59
End: 2024-09-26

## 2024-09-27 LAB
FREE TESTOST DIRECT: 4.7 PG/ML
TESTOSTERONE: 184 NG/DL

## 2024-09-27 NOTE — TELEPHONE ENCOUNTER
Dr. Rboin kindly see patient's MyChart response and advise.       Bernice Guerrero,     Your laboratory results were in acceptable range except elevated Hemoglobin level and mild cholesterol elevation.  This can happen in smokers which you are not but also in sleep apnea.  Do you have bad snoring, daytime drowsiness or anyone tell you that you stop breathing at night while asleep?  If you are suspicious for possible sleep apnea let me know and I will order a sleep study.   Written by Khari Robin DO on 9/26/2024  9:02 AM CDT  Seen by patient Cesar CASTRO Andresleo on 9/26/2024  1:31 PM

## 2024-10-09 DIAGNOSIS — D45 POLYCYTHEMIA VERA (HCC): Primary | ICD-10-CM

## 2024-10-31 ENCOUNTER — OFFICE VISIT (OUTPATIENT)
Facility: LOCATION | Age: 59
End: 2024-10-31
Payer: COMMERCIAL

## 2024-10-31 VITALS
HEIGHT: 67.5 IN | SYSTOLIC BLOOD PRESSURE: 120 MMHG | WEIGHT: 192 LBS | DIASTOLIC BLOOD PRESSURE: 82 MMHG | BODY MASS INDEX: 29.78 KG/M2

## 2024-10-31 DIAGNOSIS — C20 RECTAL CANCER (HCC): ICD-10-CM

## 2024-10-31 DIAGNOSIS — E66.3 OVERWEIGHT (BMI 25.0-29.9): ICD-10-CM

## 2024-10-31 DIAGNOSIS — E07.9 THYROID DISEASE: ICD-10-CM

## 2024-10-31 DIAGNOSIS — E23.7 PITUITARY DISEASE (HCC): ICD-10-CM

## 2024-10-31 DIAGNOSIS — N52.9 ERECTILE DYSFUNCTION, UNSPECIFIED ERECTILE DYSFUNCTION TYPE: ICD-10-CM

## 2024-10-31 DIAGNOSIS — R79.89 LOW TESTOSTERONE: Primary | ICD-10-CM

## 2024-10-31 PROCEDURE — 3074F SYST BP LT 130 MM HG: CPT | Performed by: INTERNAL MEDICINE

## 2024-10-31 PROCEDURE — 3008F BODY MASS INDEX DOCD: CPT | Performed by: INTERNAL MEDICINE

## 2024-10-31 PROCEDURE — 99245 OFF/OP CONSLTJ NEW/EST HI 55: CPT | Performed by: INTERNAL MEDICINE

## 2024-10-31 PROCEDURE — 3079F DIAST BP 80-89 MM HG: CPT | Performed by: INTERNAL MEDICINE

## 2024-10-31 NOTE — PROGRESS NOTES
New Patient Evaluation - History and Physical    CONSULT - Reason for Visit: low testosterone, ED,    Requesting Physician:   Mehnaz Robin DO    CHIEF COMPLAINT:    Chief Complaint   Patient presents with    Consult     hormones        HISTORY OF PRESENT ILLNESS:   Cesar Viramontes is a 59 year old male who presents with low testosterone, ED, colorectal ca s/p surgery in 2019, and possible RUBEN    ?RUBEN  9-10 PM bedtime   6-7 AM gets up  Does not recall waking up gasping for air  Does not snore  Feels tired middle of the day    He had work up done in  9/2024 for ED. Reviewed PCP notes and w/u. D/w pt.   Sx started ~ 2mo ago.   Has headache, BP fluctuation, low libido,     Was not in relationship in long time. Now in relationship and not performing as before.    He gets morning erection  No change in testicles  Erection is not as strong and does not last  Working out regularly x3/wk in the last 2 yrs. Gained muscle.  He has lack of motivation and fatigue   Wt is stable  Fainted 9/2024 ?possible triggered by marijuana waiting for dinner table to be ready.     The patient endorses the bolded symptoms: Decreased ability to play sports, falling asleep after dinner, change in mood, sad, does not enjoy life or the same activities as before, change in body hair and facial hair, hot flashes, heat/cold intolerance, ,skin tags, excessive weight gain, Height loss ~1.5 inches, Hands/shoe/hat size, gynecomastia, galactorrhea, Headache, Change in vision,     Puberty: unremarkable   He has 2  biological children at age 16 and 31 now  . He did not need to seek fertility assistance/treatment.   Steroid: No    Anabolic steroids: No   Previous testosterone or \"supplements\" : No   Head trauma: No   Infection (mumps) or trauma to the testicles: No     ASSESSMENT AND PLAN:  Cesar Viramontes is a 59 year old male who presents with  low testosterone, ED, colorectal ca s/p surgery in 2019, and possible RUBEN  Labs showed low Testosterone total and  free.   Discussed interfering factors RUBEN, poor sleep quality, overWt, etoh and marijuana and other factors  Plan  Fasting AM labs after good night sleep   Will order more w/u if needed  If needs MRI , will order it  Sleep hygiene list     [email or web address]      Discussed lifestyle changes and will attach info to review   Labs in 1-2 mo and RTC after that        PAST MEDICAL HISTORY:   Past Medical History:    Chalazion of right eye    RUL and RLL, OD    Chalazion right upper eyelid    Rectal cancer (HCC)    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    Visual impairment    glasses       PAST SURGICAL HISTORY:   Past Surgical History:   Procedure Laterality Date    Chemosurg mohs 1st stage Right 1994    cheek    Colonoscopy  11/2019    clip left from colonoscopy     Colonoscopy N/A 1/16/2024    Procedure: COLONOSCOPY;  Surgeon: Danie Joshi MD;  Location: [facility] ENDOSCOPY    Inguinal hernia repair Right 1994    Inguinal hernia repair Left 09/19/2019    Lap, surg; colectomy, partial, w/anastomosis, w/coloproctostomy  12/10/2019       CURRENT MEDICATIONS:     aspirin 81 MG Oral Tab EC Take 1 tablet (81 mg total) by mouth daily.      Vitamin D, Cholecalciferol, 50 MCG (2000 UT) Oral Cap Take by mouth.         ALLERGIES:  Allergies[1]    SOCIAL HISTORY:    Social History     Socioeconomic History    Marital status: OTHER    Number of children: 4   Occupational History    Occupation: [employer], [employer]     Employer: [employer]   Tobacco Use    Smoking status: Never    Smokeless tobacco: Never   Vaping Use    Vaping status: Never Used   Substance and Sexual Activity    Alcohol use: Yes     Alcohol/week: 2.0 standard drinks of alcohol     Types: 2 Glasses of wine per week     Comment: 1-2 glasses of wine daily    Drug use: Yes     Types: Cannabis     Comment: eddibles once a week   Other Topics Concern    Caffeine  Concern Yes     Comment: coffee    Pt has a pacemaker No    Pt has a defibrillator No    Reaction to local anesthetic No   HR   Smoking no  Marijuana yes   Etoh   1-2 drinks/wine a day  Drugs no  FAMILY HISTORY:   Family History   Problem Relation Age of Onset    Prostate Cancer Father     Hypertension Father     Stroke Father     Heart Disease Father     Breast Cancer Mother     Other (Other) Sister         parathryoid dysfunction    Diabetes Neg     Glaucoma Neg     Macular degeneration Neg         REVIEW OF SYSTEMS:  All negative other than HPI    PHYSICAL EXAM:   Height: 5' 7.5\" (171.5 cm) (10/31 1400)  Weight: 192 lb (87.1 kg) (10/31 1400)  BSA (Calculated - sq m): 2 sq meters (10/31 1400)  Pulse: --  BP: 120/82 (10/31 1400)  Temp: --  Do Not Use - Resp Rate: --  SpO2: --    Body mass index is 29.63 kg/m².  No skin lesions   No goiter   CONSTITUTIONAL:  Awake and alert. Age appropriate, good hygiene not in acute distress. Well-nourished and well developed. no acute distress   PSYCH:   Orientated to time, place, person & situation, Normal mood and affect, memory intact, normal insight and judgment, cooperative  Neuro: speech is clear. Awake, alert, no aphasia, no facial asymmetry, no nuchal rigidity  EYES:  No proptosis, no ptosis, conjunctiva normal  ENT:  Normocephalic, atraumatic  Eye: EOMI, normal lids, no discharge, no conjunctival erythema. No exophthalmos/proptosis, Ptosis negative   No rhinorrhea, moist oral mucosa  Neck: full range of motion  Neck/Thyroid: neck inspection: normal, No scar, No goiter   LUNGS:  No acute respiratory distress, non-labored respiration. Speaking full sentences  CARDIOVASCULAR:  regular rate   ABDOMEN:  No abdominal pain.   SKIN:  no bruising or bleeding, no rashes and no lesions, Skin is dry, no obvious rashes or lesions  EXTREMITIES: no gross abnormality   MSK: Moves extremities spontaneously. full range of motion in all major joints      DATA:     Pertinent data reviewed       11/20/19 15:14   CT   ADRENALS: No mass or enlargement    Rpt: View report in Results Review for more information   Latest Reference Range & Units 09/21/24 09:41   Free Testost Direct 7.2 - 24.0 pg/mL 4.7 (L)   (L): Data is abnormally low   Latest Reference Range & Units 09/21/24 09:41   TSH 0.550 - 4.780 mIU/mL 1.607   TESTOSTERONE 264 - 916 ng/dL 184 (L)   (L): Data is abnormally low   Latest Reference Range & Units 01/15/24 08:42 09/21/24 09:41   Cholesterol, Total <200 mg/dL 158 198   Triglycerides 30 - 149 mg/dL 81 118   HDL Cholesterol 40 - 59 mg/dL 37 (L) 38 (L)   VLDL 0 - 30 mg/dL 14 22   NON-HDL CHOLESTEROL <130 mg/dL 121 160 (H)   LDL Cholesterol Calc <100 mg/dL 105 (H) 139 (H)   PSA Screen <=4.00 ng/mL 0.49 0.71   A/G Ratio 1.0 - 2.0  1.7 1.6   PROTEIN, TOTAL 5.7 - 8.2 g/dL 7.1 7.5   Albumin 3.2 - 4.8 g/dL 4.5 4.6   VITAMIN D, 25-OH, TOTAL 30.0 - 100.0 ng/mL  33.4   (L): Data is abnormally low  (H): Data is abnormally high     No results for input(s): \"TSH\", \"T4F\", \"T3F\", \"THYP\" in the last 72 hours.  No results found.    Orders Placed This Encounter   Procedures    TSH and Free T4    Comp Metabolic Panel (14)    Prolactin    Testosterone,Total and Weakly Bound w/ SHBG    FSH    Cortisol    Estradiol    IGF-1    LH (Luteinizing Hormone)    Testosterone,Total and Weakly Bound w/ SHBG     Orders Placed This Encounter    TSH and Free T4     Order Specific Question:   Release to patient     Answer:   Immediate    Comp Metabolic Panel (14)     Order Specific Question:   Release to patient     Answer:   Immediate    Prolactin     Order Specific Question:   Release to patient     Answer:   Immediate    Testosterone,Total and Weakly Bound w/ SHBG     Order Specific Question:   Release to patient     Answer:   Immediate    FSH     Standing Status:   Future     Standing Expiration Date:   10/31/2025     Order Specific Question:   Release to patient     Answer:   Immediate    Cortisol    Estradiol     Standing  Status:   Future     Standing Expiration Date:   10/31/2025     Order Specific Question:   Release to patient     Answer:   Immediate    IGF-1     Order Specific Question:   Release to patient     Answer:   Immediate    LH (Luteinizing Hormone)     Standing Status:   Future     Standing Expiration Date:   10/31/2025     Order Specific Question:   Release to patient     Answer:   Immediate    Testosterone,Total and Weakly Bound w/ SHBG     Standing Status:   Future     Standing Expiration Date:   10/31/2025     Order Specific Question:   Release to patient     Answer:   Immediate          This is a specialized patient consultation in endocrinology and required comprehensive review of prior records, as well as current evaluation, with time required for consideration of complex endocrine issues and consultation. For this visit, I personally interviewed the patient, and family member if accompanied, performed the pertinent parts of the history and physical examination. ROS included screening for appropriate endocrine conditions.   Today's diagnosis and plan were reviewed in detail with the patient who states understanding and agrees with plan. I discussed with the patient possible diagnosis, differential diagnosis, need for work up, treatment options, alternatives and side effects.     Please see note for details about time spent which includes:   · pre-visit preparation  · reviewing records  · face to face time with the patient   · timely documentation of the encounter  · ordering medications/tests  · communication with care team  · care coordination    I appreciate the opportunity to be part of your patient's medical care and will keep you, as the referring and primary physicians, informed about the care of your patient. Please feel free to contact me should you have any questions.    The 21st Century Cures Act makes medical notes like these available to patients in the interest of transparency. Please be advised this  is a medical document. Medical documents are intended to carry relevant information, facts as evident, and the clinical opinion of the practitioner. The medical note is intended as peer to peer communication and may appear blunt or direct. It is written in medical language and may contain abbreviations or verbiage that are unfamiliar.   Diana Bradley MD             [1] No Known Allergies

## 2024-10-31 NOTE — PATIENT INSTRUCTIONS
Fasting AM labs after good night sleep   Will order more w/u if needed  If needs MRI , will order it  Sleep hygiene list     [email or web address]    Discussed lifestyle changes and will attach info to review     Labs in 1-2 mo and RTC after that

## 2024-11-07 ENCOUNTER — LAB ENCOUNTER (OUTPATIENT)
Dept: LAB | Age: 59
End: 2024-11-07
Attending: INTERNAL MEDICINE
Payer: COMMERCIAL

## 2024-11-07 DIAGNOSIS — E66.3 OVERWEIGHT (BMI 25.0-29.9): ICD-10-CM

## 2024-11-07 DIAGNOSIS — C20 RECTAL CANCER (HCC): ICD-10-CM

## 2024-11-07 DIAGNOSIS — E23.7 PITUITARY DISEASE (HCC): ICD-10-CM

## 2024-11-07 DIAGNOSIS — R79.89 LOW TESTOSTERONE: ICD-10-CM

## 2024-11-07 DIAGNOSIS — N52.9 ERECTILE DYSFUNCTION, UNSPECIFIED ERECTILE DYSFUNCTION TYPE: ICD-10-CM

## 2024-11-07 DIAGNOSIS — E07.9 THYROID DISEASE: ICD-10-CM

## 2024-11-07 LAB
ALBUMIN SERPL-MCNC: 4.4 G/DL (ref 3.2–4.8)
ALBUMIN/GLOB SERPL: 1.5 {RATIO} (ref 1–2)
ALP LIVER SERPL-CCNC: 64 U/L
ALT SERPL-CCNC: 35 U/L
ANION GAP SERPL CALC-SCNC: 8 MMOL/L (ref 0–18)
AST SERPL-CCNC: 24 U/L (ref ?–34)
BILIRUB SERPL-MCNC: 1.5 MG/DL (ref 0.3–1.2)
BUN BLD-MCNC: 21 MG/DL (ref 9–23)
BUN/CREAT SERPL: 18.4 (ref 10–20)
CALCIUM BLD-MCNC: 9.8 MG/DL (ref 8.7–10.4)
CHLORIDE SERPL-SCNC: 107 MMOL/L (ref 98–112)
CO2 SERPL-SCNC: 27 MMOL/L (ref 21–32)
CORTIS SERPL-MCNC: 19.5 UG/DL
CREAT BLD-MCNC: 1.14 MG/DL
EGFRCR SERPLBLD CKD-EPI 2021: 75 ML/MIN/1.73M2 (ref 60–?)
ESTRADIOL SERPL-MCNC: 21.1 PG/ML
FASTING STATUS PATIENT QL REPORTED: YES
FSH SERPL-ACNC: 2.1 MIU/ML
GLOBULIN PLAS-MCNC: 2.9 G/DL (ref 2–3.5)
GLUCOSE BLD-MCNC: 100 MG/DL (ref 70–99)
LH SERPL-ACNC: 1.3 MIU/ML
OSMOLALITY SERPL CALC.SUM OF ELEC: 297 MOSM/KG (ref 275–295)
POTASSIUM SERPL-SCNC: 4.3 MMOL/L (ref 3.5–5.1)
PROLACTIN SERPL-MCNC: 6.2 NG/ML
PROT SERPL-MCNC: 7.3 G/DL (ref 5.7–8.2)
SODIUM SERPL-SCNC: 142 MMOL/L (ref 136–145)
T4 FREE SERPL-MCNC: 1 NG/DL (ref 0.8–1.7)
TSI SER-ACNC: 1.03 UIU/ML (ref 0.55–4.78)

## 2024-11-07 PROCEDURE — 82670 ASSAY OF TOTAL ESTRADIOL: CPT

## 2024-11-07 PROCEDURE — 82533 TOTAL CORTISOL: CPT | Performed by: INTERNAL MEDICINE

## 2024-11-07 PROCEDURE — 84443 ASSAY THYROID STIM HORMONE: CPT | Performed by: INTERNAL MEDICINE

## 2024-11-07 PROCEDURE — 84410 TESTOSTERONE BIOAVAILABLE: CPT | Performed by: INTERNAL MEDICINE

## 2024-11-07 PROCEDURE — 83002 ASSAY OF GONADOTROPIN (LH): CPT

## 2024-11-07 PROCEDURE — 83001 ASSAY OF GONADOTROPIN (FSH): CPT

## 2024-11-07 PROCEDURE — 84439 ASSAY OF FREE THYROXINE: CPT | Performed by: INTERNAL MEDICINE

## 2024-11-07 PROCEDURE — 36415 COLL VENOUS BLD VENIPUNCTURE: CPT | Performed by: INTERNAL MEDICINE

## 2024-11-07 PROCEDURE — 84146 ASSAY OF PROLACTIN: CPT | Performed by: INTERNAL MEDICINE

## 2024-11-07 PROCEDURE — 80053 COMPREHEN METABOLIC PANEL: CPT | Performed by: INTERNAL MEDICINE

## 2024-11-07 PROCEDURE — 84305 ASSAY OF SOMATOMEDIN: CPT | Performed by: INTERNAL MEDICINE

## 2024-11-11 LAB
IGF I: 112 NG/ML
IGF-1, Z SCORE: -0.7 S.D.

## 2024-11-15 ENCOUNTER — TELEPHONE (OUTPATIENT)
Dept: ENDOCRINOLOGY CLINIC | Facility: CLINIC | Age: 59
End: 2024-11-15

## 2024-11-15 DIAGNOSIS — R79.89 LOW TESTOSTERONE: Primary | ICD-10-CM

## 2024-11-15 LAB
SEX HORM BIND GLOB: 26.7 NMOL/L
TESTOST % FREE+WEAK BND: 23.3 %
TESTOST FREE+WEAK BND: 53.7 NG/DL
TESTOSTERONE TOT /MS: 230.5 NG/DL

## 2024-11-15 NOTE — TELEPHONE ENCOUNTER
Please call pt with work up result   Testost Free+Weak Bnd  40.0 - 250.0 ng/dL 53.7     Free testo is normal   Total testo is low but improved from before   We discussed lifestyle changes   Repeat fasting AM labs before next visit   schedule f/u visit  in 2-3 mo      Will discuss more next visit   Thanks      room air

## 2025-01-02 ENCOUNTER — OFFICE VISIT (OUTPATIENT)
Dept: FAMILY MEDICINE CLINIC | Facility: CLINIC | Age: 60
End: 2025-01-02
Payer: COMMERCIAL

## 2025-01-02 VITALS
OXYGEN SATURATION: 98 % | RESPIRATION RATE: 18 BRPM | BODY MASS INDEX: 29.78 KG/M2 | SYSTOLIC BLOOD PRESSURE: 123 MMHG | HEART RATE: 82 BPM | HEIGHT: 67.5 IN | WEIGHT: 192 LBS | DIASTOLIC BLOOD PRESSURE: 81 MMHG | TEMPERATURE: 99 F

## 2025-01-02 DIAGNOSIS — R52 BODY ACHES: ICD-10-CM

## 2025-01-02 DIAGNOSIS — U07.1 COVID-19: Primary | ICD-10-CM

## 2025-01-02 LAB
COVID19 BINAX NOW ANTIGEN: DETECTED
OPERATOR ID: ABNORMAL
POCT LOT NUMBER: ABNORMAL

## 2025-01-02 PROCEDURE — 3079F DIAST BP 80-89 MM HG: CPT | Performed by: FAMILY MEDICINE

## 2025-01-02 PROCEDURE — 3074F SYST BP LT 130 MM HG: CPT | Performed by: FAMILY MEDICINE

## 2025-01-02 PROCEDURE — 99214 OFFICE O/P EST MOD 30 MIN: CPT | Performed by: FAMILY MEDICINE

## 2025-01-02 PROCEDURE — 3008F BODY MASS INDEX DOCD: CPT | Performed by: FAMILY MEDICINE

## 2025-01-02 RX ORDER — PHENOL 1.4 %
AEROSOL, SPRAY (ML) MUCOUS MEMBRANE
COMMUNITY
Start: 2024-07-01

## 2025-01-02 NOTE — PATIENT INSTRUCTIONS
See patient instructions.  Standby prescription for Paxlovid in the case that the patient takes a turn for worsening health condition.

## 2025-01-09 NOTE — PROGRESS NOTES
Subjective:     Patient ID: Cesar Viramontes is a 59 year old male.    This patient is a 59-year-old gentleman who began to have subtle upper respiratory symptoms this past weekend and on Tuesday night developed a fever and bodyaches and overwhelming fatigue.  Patient has not tested himself for any infectious disease at home including COVID.    Appetite is okay.  Taste and smell are intact.  There is no nausea, vomiting, diarrhea, or blood in the stool.    There is a nasal congestion with occasional nonproductive coughing.        History/Other:   Review of Systems  Current Outpatient Medications   Medication Sig Dispense Refill    Magnesium 250 MG Oral Cap       Multiple Vitamins-Minerals (MULTIVITAMIN ADULTS 50+) Oral Tab       aspirin 81 MG Oral Tab EC Take 1 tablet (81 mg total) by mouth daily.      Vitamin D, Cholecalciferol, 50 MCG (2000 UT) Oral Cap Take by mouth.       Allergies:Allergies[1]    Past Medical History:    Chalazion of right eye    RUL and RLL, OD    Chalazion right upper eyelid    Rectal cancer (HCC)    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    Visual impairment    glasses      Past Surgical History:   Procedure Laterality Date    Chemosurg mohs 1st stage Right 1994    cheek    Colonoscopy  11/2019    clip left from colonoscopy     Colonoscopy N/A 1/16/2024    Procedure: COLONOSCOPY;  Surgeon: Danie Joshi MD;  Location: [facility] ENDOSCOPY    Inguinal hernia repair Right 1994    Inguinal hernia repair Left 09/19/2019    Lap, surg; colectomy, partial, w/anastomosis, w/coloproctostomy  12/10/2019      Family History   Problem Relation Age of Onset    Prostate Cancer Father     Hypertension Father     Stroke Father     Heart Disease Father     Breast Cancer Mother     Other (Other) Sister         parathryoid dysfunction    Diabetes Neg     Glaucoma Neg     Macular degeneration Neg       Social History:   Social History     Socioeconomic History    Marital status: OTHER    Number  of children: 4   Occupational History    Occupation: [employer], [employer] community     Employer: [employer]   Tobacco Use    Smoking status: Never    Smokeless tobacco: Never   Vaping Use    Vaping status: Never Used   Substance and Sexual Activity    Alcohol use: Yes     Alcohol/week: 2.0 standard drinks of alcohol     Types: 2 Glasses of wine per week     Comment: 1-2 glasses of wine daily    Drug use: Yes     Types: Cannabis     Comment: eddibles once a week   Other Topics Concern    Caffeine Concern Yes     Comment: coffee    Pt has a pacemaker No    Pt has a defibrillator No    Reaction to local anesthetic No        Objective:   Vitals:    01/02/25 1139   BP: 123/81   Pulse: 82   Resp: 18   Temp: 98.6 °F (37 °C)       Physical Exam  Constitutional:       Appearance: He is ill-appearing.   HENT:      Right Ear: Tympanic membrane normal.      Left Ear: Tympanic membrane normal.      Nose: Congestion present.      Mouth/Throat:      Pharynx: No oropharyngeal exudate or posterior oropharyngeal erythema.      Comments: Streaky capillaries in the posterior pharyngeal region.  Cardiovascular:      Rate and Rhythm: Normal rate and regular rhythm.      Heart sounds:      No gallop.   Pulmonary:      Effort: Pulmonary effort is normal.      Breath sounds: Normal breath sounds. No wheezing, rhonchi or rales.   Neurological:      Mental Status: He is alert.         Assessment & Plan:   1. COVID-19  Prescription for Paxlovid printed out as a standby in the case that the patient's symptoms worsen.  It has been explained to him that he has a very short window of opportunity where this medication can be effective.  See all information and patient education regarding COVID supportive care treatment.  - nirmatrelvir-ritonavir 300-100 MG Oral Tablet Therapy Pack; Take two nirmatrelvir tablets (300mg) with one ritonavir tablet (100mg) together twice daily for 5 days.  Dispense: 30 tablet; Refill: 0    2. Body aches  Incentive  spirometry as well as Tylenol extra strength for pain and fever control and exaggerated clear water hydration emphasized.  - POC COVID19 BinaxNOW Antigen  - nirmatrelvir-ritonavir 300-100 MG Oral Tablet Therapy Pack; Take two nirmatrelvir tablets (300mg) with one ritonavir tablet (100mg) together twice daily for 5 days.  Dispense: 30 tablet; Refill: 0        Orders Placed This Encounter   Procedures    POC COVID19 BinaxNOW Antigen       Meds This Visit:  Requested Prescriptions     Signed Prescriptions Disp Refills    nirmatrelvir-ritonavir 300-100 MG Oral Tablet Therapy Pack 30 tablet 0     Sig: Take two nirmatrelvir tablets (300mg) with one ritonavir tablet (100mg) together twice daily for 5 days.       Imaging & Referrals:  None     Patient Instructions   See patient instructions.  Standby prescription for Paxlovid in the case that the patient takes a turn for worsening health condition.    Return if symptoms worsen or fail to improve.           [1] No Known Allergies

## 2025-01-17 ENCOUNTER — LAB ENCOUNTER (OUTPATIENT)
Dept: LAB | Age: 60
End: 2025-01-17
Attending: INTERNAL MEDICINE
Payer: COMMERCIAL

## 2025-01-17 DIAGNOSIS — R79.89 LOW TESTOSTERONE: ICD-10-CM

## 2025-01-17 PROCEDURE — 36415 COLL VENOUS BLD VENIPUNCTURE: CPT

## 2025-01-17 PROCEDURE — 84410 TESTOSTERONE BIOAVAILABLE: CPT

## 2025-01-24 ENCOUNTER — OFFICE VISIT (OUTPATIENT)
Facility: CLINIC | Age: 60
End: 2025-01-24
Payer: COMMERCIAL

## 2025-01-24 VITALS
SYSTOLIC BLOOD PRESSURE: 136 MMHG | HEART RATE: 65 BPM | BODY MASS INDEX: 29.25 KG/M2 | DIASTOLIC BLOOD PRESSURE: 78 MMHG | WEIGHT: 193 LBS | HEIGHT: 68 IN

## 2025-01-24 DIAGNOSIS — R79.89 LOW TESTOSTERONE: Primary | ICD-10-CM

## 2025-01-24 DIAGNOSIS — E07.9 THYROID DISEASE: ICD-10-CM

## 2025-01-24 DIAGNOSIS — N52.9 ERECTILE DYSFUNCTION, UNSPECIFIED ERECTILE DYSFUNCTION TYPE: ICD-10-CM

## 2025-01-24 DIAGNOSIS — C20 RECTAL CANCER (HCC): ICD-10-CM

## 2025-01-24 DIAGNOSIS — E66.3 OVERWEIGHT (BMI 25.0-29.9): ICD-10-CM

## 2025-01-24 PROCEDURE — 3008F BODY MASS INDEX DOCD: CPT | Performed by: INTERNAL MEDICINE

## 2025-01-24 PROCEDURE — 3075F SYST BP GE 130 - 139MM HG: CPT | Performed by: INTERNAL MEDICINE

## 2025-01-24 PROCEDURE — 3078F DIAST BP <80 MM HG: CPT | Performed by: INTERNAL MEDICINE

## 2025-01-24 PROCEDURE — 99214 OFFICE O/P EST MOD 30 MIN: CPT | Performed by: INTERNAL MEDICINE

## 2025-01-24 NOTE — PROGRESS NOTES
Reason for Visit: low testosterone, ED,    Requesting Physician:   Mehnaz Robin DO    CHIEF COMPLAINT:    No chief complaint on file.     HISTORY OF PRESENT ILLNESS:   Cesar Viramontes is a 59 year old male who presents with low testosterone, ED, colorectal ca s/p surgery in 2019, and possible RUBEN    Stopped etoh and cannabis   He noticed decreased morning erection. Has low libido  and ED. Erection is not as strong and does not last   Did labs but not reported yet. We called labs and still needs few days to get resulted    Denied RUBEN sx       Was not in relationship in long time. Now in relationship   No change in testicle size  Working out regularly x3/wk in the last 2 yrs. Gained muscle.  He has lack of motivation and fatigue   Wt is stable  Fainted 9/2024 ?possible triggered by marijuana waiting for dinner table to be ready.    Puberty: unremarkable   He has 2  biological children at age 16 and 31 now  . He did not need to seek fertility assistance/treatment.   Steroid: No    Anabolic steroids: No   Previous testosterone or \"supplements\" : No   Head trauma: No   Infection (mumps) or trauma to the testicles: No     ASSESSMENT AND PLAN:  Cesar Viramontes is a 59 year old male who presents with  low testosterone, ED, colorectal ca s/p surgery in 2019, and possible RUBEN  Pending repeated labs after lifestyle changes   Discussed interfering factors RUBEN, poor sleep quality, overWt, etoh and marijuana and other factors    Plan  Pending labs   Will review and reassess the need for more w/u             PAST MEDICAL HISTORY:   Past Medical History:    Chalazion of right eye    RUL and RLL, OD    Chalazion right upper eyelid    Rectal cancer (HCC)    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    Visual impairment    glasses       PAST SURGICAL HISTORY:   Past Surgical History:   Procedure Laterality Date    Chemosurg mohs 1st stage Right 1994    cheek    Colonoscopy  11/2019    clip left from colonoscopy      Colonoscopy N/A 1/16/2024    Procedure: COLONOSCOPY;  Surgeon: Danie Joshi MD;  Location: [facility] ENDOSCOPY    Inguinal hernia repair Right 1994    Inguinal hernia repair Left 09/19/2019    Lap, surg; colectomy, partial, w/anastomosis, w/coloproctostomy  12/10/2019       CURRENT MEDICATIONS:    No outpatient medications have been marked as taking for the 1/24/25 encounter (Office Visit) with Diana Bradley MD.       ALLERGIES:  Allergies[1]    SOCIAL HISTORY:    Social History     Socioeconomic History    Marital status: OTHER    Number of children: 4   Occupational History    Occupation: [employer], [employer]     Employer: [employer]   Tobacco Use    Smoking status: Never    Smokeless tobacco: Never   Vaping Use    Vaping status: Never Used   Substance and Sexual Activity    Alcohol use: Yes     Alcohol/week: 2.0 standard drinks of alcohol     Types: 2 Glasses of wine per week     Comment: 1-2 glasses of wine daily    Drug use: Yes     Types: Cannabis     Comment: eddibles once a week   Other Topics Concern    Caffeine Concern Yes     Comment: coffee    Pt has a pacemaker No    Pt has a defibrillator No    Reaction to local anesthetic No   HR   Smoking no  Marijuana yes   Etoh   1-2 drinks/wine a day  Drugs no  FAMILY HISTORY:   Family History   Problem Relation Age of Onset    Prostate Cancer Father     Hypertension Father     Stroke Father     Heart Disease Father     Breast Cancer Mother     Other (Other) Sister         parathryoid dysfunction    Diabetes Neg     Glaucoma Neg     Macular degeneration Neg         PHYSICAL EXAM:   Height: 5' 8\" (172.7 cm) (01/24 0940)  Weight: 193 lb (87.5 kg) (01/24 0940)  BSA (Calculated - sq m): 2.01 sq meters (01/24 0940)  Pulse: 65 (01/24 0940)  BP: 136/78 (01/24 0940)  Temp: --  Do Not Use - Resp Rate: --  SpO2: --    Body mass index is 29.35 kg/m².       DATA:     Pertinent data reviewed    Please call pt with work up result   Testost Free+Weak  Bnd  40.0 - 250.0 ng/dL 53.7     Free testo is normal   Total testo is low but improved from before   We discussed lifestyle changes   Repeat fasting AM labs before next visit   schedule f/u visit  in 2-3 mo         Latest Reference Range & Units 01/15/24 08:42 09/21/24 09:41   Hemoglobin 13.0 - 17.5 g/dL 17.1 17.6 (H)   Hematocrit 39.0 - 53.0 % 48.5 49.9   (H): Data is abnormally high   Latest Reference Range & Units 09/21/24 09:41 11/07/24 07:50 01/02/25 11:50   Free Testost Direct 7.2 - 24.0 pg/mL 4.7 (L)     IGF I 68 - 247 ng/mL  112    IGF-1, Z Score -2.0 - 2.0 S.D.  -0.7     ID    1,034,510   Sex Horm Bind Glob 19.3 - 76.4 nmol/L  26.7    Testost % Free+Weak Bnd 9.0 - 46.0 %  23.3    Testost Free+Weak Bnd 40.0 - 250.0 ng/dL  53.7    Testosterone Tot LC/.0 - 916.0 ng/dL  230.5 (L)    (L): Data is abnormally low   Latest Reference Range & Units 09/21/24 09:41 11/07/24 07:50   T4,Free (Direct) 0.8 - 1.7 ng/dL  1.0   TSH 0.550 - 4.780 uIU/mL 1.607 1.026   Cortisol ug/dL  19.5   Prolactin 2.1-17.7 ng/mL ng/mL  6.2   Estradiol <=39.8 pg/mL pg/mL  21.1   FSH 1.4-18.1 mIU/mL mIU/mL  2.1   LH 1.5-9.3 mIU/mL mIU/mL  1.3 (L)   TESTOSTERONE 264 - 916 ng/dL 184 (L)    (   11/20/19 15:14   CT   ADRENALS: No mass or enlargement    Rpt: View report in Results Review for more information   Latest Reference Range & Units 09/21/24 09:41   Free Testost Direct 7.2 - 24.0 pg/mL 4.7 (L)   (L): Data is abnormally low   Latest Reference Range & Units 09/21/24 09:41   TSH 0.550 - 4.780 mIU/mL 1.607   TESTOSTERONE 264 - 916 ng/dL 184 (L)   (L): Data is abnormally low   Latest Reference Range & Units 01/15/24 08:42 09/21/24 09:41   Cholesterol, Total <200 mg/dL 158 198   Triglycerides 30 - 149 mg/dL 81 118   HDL Cholesterol 40 - 59 mg/dL 37 (L) 38 (L)   VLDL 0 - 30 mg/dL 14 22   NON-HDL CHOLESTEROL <130 mg/dL 121 160 (H)   LDL Cholesterol Calc <100 mg/dL 105 (H) 139 (H)   PSA Screen <=4.00 ng/mL 0.49 0.71   A/G Ratio 1.0 -  2.0  1.7 1.6   PROTEIN, TOTAL 5.7 - 8.2 g/dL 7.1 7.5   Albumin 3.2 - 4.8 g/dL 4.5 4.6   VITAMIN D, 25-OH, TOTAL 30.0 - 100.0 ng/mL  33.4   (L): Data is abnormally low  (H): Data is abnormally high     No results for input(s): \"TSH\", \"T4F\", \"T3F\", \"THYP\" in the last 72 hours.  No results found.    No orders of the defined types were placed in this encounter.    No orders of the defined types were placed in this encounter.         This is a specialized patient consultation in endocrinology and required comprehensive review of prior records, as well as current evaluation, with time required for consideration of complex endocrine issues and consultation. For this visit, I personally interviewed the patient, and family member if accompanied, performed the pertinent parts of the history and physical examination. ROS included screening for appropriate endocrine conditions.   Today's diagnosis and plan were reviewed in detail with the patient who states understanding and agrees with plan. I discussed with the patient possible diagnosis, differential diagnosis, need for work up, treatment options, alternatives and side effects.     Please see note for details about time spent which includes:   · pre-visit preparation  · reviewing records  · face to face time with the patient   · timely documentation of the encounter  · ordering medications/tests  · communication with care team  · care coordination    I appreciate the opportunity to be part of your patient's medical care and will keep you, as the referring and primary physicians, informed about the care of your patient. Please feel free to contact me should you have any questions.    The 21st Century Cures Act makes medical notes like these available to patients in the interest of transparency. Please be advised this is a medical document. Medical documents are intended to carry relevant information, facts as evident, and the clinical opinion of the practitioner. The medical  note is intended as peer to peer communication and may appear blunt or direct. It is written in medical language and may contain abbreviations or verbiage that are unfamiliar.   Diana Bradley MD             [1] No Known Allergies

## 2025-01-26 LAB
SEX HORM BIND GLOB: 26.9 NMOL/L
TESTOST % FREE+WEAK BND: 26.7 %
TESTOST FREE+WEAK BND: 76.1 NG/DL
TESTOSTERONE TOT /MS: 285.1 NG/DL

## 2025-02-18 ENCOUNTER — TELEMEDICINE (OUTPATIENT)
Dept: FAMILY MEDICINE CLINIC | Facility: CLINIC | Age: 60
End: 2025-02-18
Payer: COMMERCIAL

## 2025-02-18 DIAGNOSIS — J18.9 WALKING PNEUMONIA: ICD-10-CM

## 2025-02-18 DIAGNOSIS — R07.0 THROAT PAIN: ICD-10-CM

## 2025-02-18 DIAGNOSIS — R05.3 PERSISTENT COUGH FOR 3 WEEKS OR LONGER: Primary | ICD-10-CM

## 2025-02-18 PROCEDURE — 98005 SYNCH AUDIO-VIDEO EST LOW 20: CPT | Performed by: FAMILY MEDICINE

## 2025-02-18 RX ORDER — AZITHROMYCIN 250 MG/1
TABLET, FILM COATED ORAL
Qty: 6 TABLET | Refills: 0 | Status: SHIPPED | OUTPATIENT
Start: 2025-02-18 | End: 2025-02-23

## 2025-02-18 RX ORDER — DEXTROMETHORPHAN HYDROBROMIDE AND PROMETHAZINE HYDROCHLORIDE 15; 6.25 MG/5ML; MG/5ML
5 SYRUP ORAL 4 TIMES DAILY PRN
Qty: 240 ML | Refills: 0 | Status: SHIPPED | OUTPATIENT
Start: 2025-02-18

## 2025-02-18 RX ORDER — METHYLPREDNISOLONE 4 MG/1
TABLET ORAL
Qty: 1 EACH | Refills: 0 | Status: SHIPPED | OUTPATIENT
Start: 2025-02-18

## 2025-02-18 NOTE — PROGRESS NOTES
Subjective:     Patient ID: Cesar Viramontes is a 59 year old male.    This patient is a 59-year-old  gentleman who has consented to this virtual video visit secondary to ongoing symptoms since he was diagnosed and confirmed to have COVID on January 2, 2025.  The patient was provided supportive care advisement as well as cough medication and recommendation for vitamin D3 and a prescription for Paxlovid that he never had feel secondary to the cost (went to the wrong pharmacy).    Patient continues to experience semiproductive cough which is worse at night and there is nasal congestion and postnasal drainage.  He is beginning to get throat pain as he points to the left side of his neck and it appears to be referring discomfort to his left ear.    The patient does have a history for walking pneumonia x 2.     Patient is visible on this virtual video visit and appears to be in no acute distress in no acute respiratory distress.        History/Other:   Review of Systems  Current Outpatient Medications   Medication Sig Dispense Refill    promethazine-dextromethorphan 6.25-15 MG/5ML Oral Syrup Take 5 mL by mouth 4 (four) times daily as needed. 240 mL 0    azithromycin (ZITHROMAX Z-SHANITA) 250 MG Oral Tab Take 2 tablets (500 mg total) by mouth daily for 1 day, THEN 1 tablet (250 mg total) daily for 4 days. 6 tablet 0    methylPREDNISolone (MEDROL) 4 MG Oral Tablet Therapy Pack As directed. 1 each 0    Magnesium 250 MG Oral Cap       Multiple Vitamins-Minerals (MULTIVITAMIN ADULTS 50+) Oral Tab       aspirin 81 MG Oral Tab EC Take 1 tablet (81 mg total) by mouth daily.      Vitamin D, Cholecalciferol, 50 MCG (2000 UT) Oral Cap Take by mouth.       Allergies:Allergies[1]    Past Medical History:    Chalazion of right eye    RUL and RLL, OD    Chalazion right upper eyelid    Rectal cancer (HCC)    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    Visual impairment    glasses      Past Surgical History:   Procedure Laterality  Date    Chemosurg mohs 1st stage Right 1994    cheek    Colonoscopy  11/2019    clip left from colonoscopy     Colonoscopy N/A 1/16/2024    Procedure: COLONOSCOPY;  Surgeon: Danie Joshi MD;  Location: [facility] ENDOSCOPY    Inguinal hernia repair Right 1994    Inguinal hernia repair Left 09/19/2019    Lap, surg; colectomy, partial, w/anastomosis, w/coloproctostomy  12/10/2019      Family History   Problem Relation Age of Onset    Prostate Cancer Father     Hypertension Father     Stroke Father     Heart Disease Father     Breast Cancer Mother     Other (Other) Sister         parathryoid dysfunction    Diabetes Neg     Glaucoma Neg     Macular degeneration Neg       Social History:   Social History     Socioeconomic History    Marital status: OTHER    Number of children: 4   Occupational History    Occupation: [employer], [employer]     Employer: [employer]   Tobacco Use    Smoking status: Never    Smokeless tobacco: Never   Vaping Use    Vaping status: Never Used   Substance and Sexual Activity    Alcohol use: Yes     Alcohol/week: 2.0 standard drinks of alcohol     Types: 2 Glasses of wine per week     Comment: 1-2 glasses of wine daily    Drug use: Yes     Types: Cannabis     Comment: eddibles once a week   Other Topics Concern    Caffeine Concern Yes     Comment: coffee    Pt has a pacemaker No    Pt has a defibrillator No    Reaction to local anesthetic No        Objective:   There were no vitals filed for this visit.  This is a virtual video visit and the vitals could be obtained.    Physical Exam  Constitutional:       General: He is not in acute distress.     Appearance: Normal appearance. He is not ill-appearing.   Neurological:      Mental Status: He is alert and oriented to person, place, and time.         Assessment & Plan:   1. Persistent cough for 3 weeks or longer  Prescribed.  - promethazine-dextromethorphan 6.25-15 MG/5ML Oral Syrup; Take 5 mL by mouth 4 (four) times daily  as needed.  Dispense: 240 mL; Refill: 0    2. Throat pain  Possibly and probably no infection and not related to COVID which was diagnosed and treated in early part of January 2025.    3. Walking pneumonia  Empiric treatment by physician's choice and also patient's history.  - azithromycin (ZITHROMAX Z-SHANITA) 250 MG Oral Tab; Take 2 tablets (500 mg total) by mouth daily for 1 day, THEN 1 tablet (250 mg total) daily for 4 days.  Dispense: 6 tablet; Refill: 0  - methylPREDNISolone (MEDROL) 4 MG Oral Tablet Therapy Pack; As directed.  Dispense: 1 each; Refill: 0      No orders of the defined types were placed in this encounter.      Meds This Visit:  Requested Prescriptions     Signed Prescriptions Disp Refills    promethazine-dextromethorphan 6.25-15 MG/5ML Oral Syrup 240 mL 0     Sig: Take 5 mL by mouth 4 (four) times daily as needed.    azithromycin (ZITHROMAX Z-SHANITA) 250 MG Oral Tab 6 tablet 0     Sig: Take 2 tablets (500 mg total) by mouth daily for 1 day, THEN 1 tablet (250 mg total) daily for 4 days.    methylPREDNISolone (MEDROL) 4 MG Oral Tablet Therapy Pack 1 each 0     Sig: As directed.       Imaging & Referrals:  None     Patient Instructions   Clear fluid hydration. Rest. Take all medications as prescribed. If symptoms persist or worsen please call or return to clinic ASAP.  Zithromax and Medrol Dosepak and Promethazine DM has been prescribed.  Patient has been advised to send us an update regarding his symptoms as needed or at least by the end of this week.  Patient also advised to stop taking all of his over-the-counter supportive care treatment medications.    Return if symptoms worsen or fail to improve.           [1] No Known Allergies

## 2025-02-18 NOTE — PATIENT INSTRUCTIONS
Clear fluid hydration. Rest. Take all medications as prescribed. If symptoms persist or worsen please call or return to clinic ASAP.  Zithromax and Medrol Dosepak and Promethazine DM has been prescribed.  Patient has been advised to send us an update regarding his symptoms as needed or at least by the end of this week.Patient also advised to stop taking all of his over-the-counter supportive care treatment medications.

## 2025-02-22 ENCOUNTER — PATIENT MESSAGE (OUTPATIENT)
Dept: FAMILY MEDICINE CLINIC | Facility: CLINIC | Age: 60
End: 2025-02-22

## 2025-03-24 ENCOUNTER — PATIENT MESSAGE (OUTPATIENT)
Facility: CLINIC | Age: 60
End: 2025-03-24

## 2025-03-24 DIAGNOSIS — R79.89 LOW TESTOSTERONE: Primary | ICD-10-CM

## 2025-03-25 NOTE — TELEPHONE ENCOUNTER
Will repeat labs   Will discuss more next visit   Thanks      Latest Reference Range & Units 01/17/25 08:00   Sex Horm Bind Glob 19.3 - 76.4 nmol/L 26.9   Testost % Free+Weak Bnd 9.0 - 46.0 % 26.7   Testost Free+Weak Bnd 40.0 - 250.0 ng/dL 76.1   Testosterone Tot LC/.0 - 916.0 ng/dL 285.1

## 2025-04-04 ENCOUNTER — LAB ENCOUNTER (OUTPATIENT)
Dept: LAB | Facility: HOSPITAL | Age: 60
End: 2025-04-04
Attending: INTERNAL MEDICINE
Payer: COMMERCIAL

## 2025-04-04 DIAGNOSIS — R79.89 LOW TESTOSTERONE: ICD-10-CM

## 2025-04-04 DIAGNOSIS — D45 POLYCYTHEMIA VERA (HCC): ICD-10-CM

## 2025-04-04 LAB
BASOPHILS # BLD AUTO: 0.04 X10(3) UL (ref 0–0.2)
BASOPHILS NFR BLD AUTO: 0.8 %
DEPRECATED RDW RBC AUTO: 42.3 FL (ref 35.1–46.3)
EOSINOPHIL # BLD AUTO: 0.16 X10(3) UL (ref 0–0.7)
EOSINOPHIL NFR BLD AUTO: 3.1 %
ERYTHROCYTE [DISTWIDTH] IN BLOOD BY AUTOMATED COUNT: 12.9 % (ref 11–15)
HCT VFR BLD AUTO: 48 %
HGB BLD-MCNC: 16.7 G/DL
IMM GRANULOCYTES # BLD AUTO: 0.02 X10(3) UL (ref 0–1)
IMM GRANULOCYTES NFR BLD: 0.4 %
LYMPHOCYTES # BLD AUTO: 1.26 X10(3) UL (ref 1–4)
LYMPHOCYTES NFR BLD AUTO: 24.4 %
MCH RBC QN AUTO: 31 PG (ref 26–34)
MCHC RBC AUTO-ENTMCNC: 34.8 G/DL (ref 31–37)
MCV RBC AUTO: 89.1 FL
MONOCYTES # BLD AUTO: 0.42 X10(3) UL (ref 0.1–1)
MONOCYTES NFR BLD AUTO: 8.1 %
NEUTROPHILS # BLD AUTO: 3.26 X10 (3) UL (ref 1.5–7.7)
NEUTROPHILS # BLD AUTO: 3.26 X10(3) UL (ref 1.5–7.7)
NEUTROPHILS NFR BLD AUTO: 63.2 %
PLATELET # BLD AUTO: 192 10(3)UL (ref 150–450)
RBC # BLD AUTO: 5.39 X10(6)UL
WBC # BLD AUTO: 5.2 X10(3) UL (ref 4–11)

## 2025-04-04 PROCEDURE — 84410 TESTOSTERONE BIOAVAILABLE: CPT

## 2025-04-04 PROCEDURE — 85025 COMPLETE CBC W/AUTO DIFF WBC: CPT

## 2025-04-04 PROCEDURE — 36415 COLL VENOUS BLD VENIPUNCTURE: CPT

## 2025-04-09 LAB
SEX HORM BIND GLOB: 30.7 NMOL/L
TESTOST % FREE+WEAK BND: 17.1 %
TESTOST FREE+WEAK BND: 46.1 NG/DL
TESTOSTERONE TOT /MS: 269.6 NG/DL

## 2025-04-22 ENCOUNTER — OFFICE VISIT (OUTPATIENT)
Dept: DERMATOLOGY CLINIC | Facility: CLINIC | Age: 60
End: 2025-04-22
Payer: COMMERCIAL

## 2025-04-22 DIAGNOSIS — L82.0 INFLAMED SEBORRHEIC KERATOSIS: ICD-10-CM

## 2025-04-22 DIAGNOSIS — D22.9 MULTIPLE BENIGN NEVI: ICD-10-CM

## 2025-04-22 DIAGNOSIS — D17.0 LIPOMA OF NECK: Primary | ICD-10-CM

## 2025-04-22 PROCEDURE — 99214 OFFICE O/P EST MOD 30 MIN: CPT | Performed by: STUDENT IN AN ORGANIZED HEALTH CARE EDUCATION/TRAINING PROGRAM

## 2025-04-22 RX ORDER — SOY ISOFLAVONE 40 MG
TABLET ORAL
COMMUNITY
Start: 2025-02-17

## 2025-04-22 NOTE — PROGRESS NOTES
Established Patient    Referred by: No referring provider defined for this encounter.    CHIEF COMPLAINT: Lesion of concern     HISTORY OF PRESENT ILLNESS: Cesar Viramontes is a 59 year old male here for evaluation of lesion of concern.    1. Growth   Location: Abdomen   Duration: Years   Bleeding, growing, changing?: No  Scaly?:No    Itchy?:No    Current treatment: None   Past treatments: None     2. Lipoma    Location: L Posterior Neck    Duration: Years   Bleeding, growing, changing?: No  Scaly?:No    Itchy?:No    Current treatment: None  Past treatments: None     Personal Dermatologic History  History of skin cancer: No  History of  atypical moles: No    FAMILY HISTORY:  History of melanoma: No    Past Medical History  Past Medical History[1]    REVIEW OF SYSTEMS:  Constitutional: Denies fever, chills, unintentional weight loss.   Skin as per HPI    Medications  Current Medications[2]    PHYSICAL EXAM:  General: awake, alert, no acute distress  Neuropsych: appropriate mood and affect  Eyes: Sclerae anicteric, without conjunctival injection, eyelids unremarkable  Skin: Skin exam was performed today including the following: neck and abdomen. Pertinent findings include:   - R abdomen with a pink stuck on papules   - abdomen with scattered regular brown papules     ASSESSMENT & PLAN:  Pathophysiology of diagnoses discussed with patient.  Therapeutic options reviewed. Risks, benefits, and alternatives discussed with patient. Instructions reviewed at length.    #Lipoma, post neck   - Referred to Dr. Marroquin for excision     #Inflamed seborrheic keratosis  - Reassured regarding benign nature of lesion   - Cryotherapy today. Medically necessary as lesion inflamed and irritated.    - Cryosurgery of non-malignant lesion(s)  - Risks, benefits, alternatives and personnel required for cryosurgery reviewed with patient. Pt verbalizes understanding and wishes to proceed.   - Cryosurgery performed with Liquid Nitrogen via cryostat  spray gun to ISK.  1lesion(s) treated.   - Patient tolerated well and wound care discussed.     #Multiple benign nevi  - Reassured patient of benign nature of these lesions.   - Return for lesions that are growing, changing or symptomatic.   - Recommend daily photoprotection with broad-spectrum sunscreen, avoidance of sun during peak hours, and sun protective clothing.   - Dermoscopy was used for physical examination of pigmented lesions during today's office visit.      Return to clinic: as needed or sooner if something concerning arises     Shawn Mccann MD    By signing my name below, Reddy DEL ROSARIO MA,  attest that this documentation has been prepared under the direction and in the presence of Shawn Mccann MD.   Electronically Signed: Reddy DODSON MA, 4/22/2025, 9:36 AM.      Shawn DEL ROSARIO MD,  personally performed the services described in this documentation. All medical record entries made by the scribe were at my direction and in my presence.  I have reviewed the chart and agree that the record reflects my personal performance and is accurate and complete.  Shawn Mccann MD, 4/22/2025, 11:35 AM               [1]   Past Medical History:   Chalazion of right eye    RUL and RLL, OD    Chalazion right upper eyelid    Rectal cancer (HCC)    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    Visual impairment    glasses   [2]   Current Outpatient Medications   Medication Sig Dispense Refill    promethazine-dextromethorphan 6.25-15 MG/5ML Oral Syrup Take 5 mL by mouth 4 (four) times daily as needed. 240 mL 0    methylPREDNISolone (MEDROL) 4 MG Oral Tablet Therapy Pack As directed. 1 each 0    Magnesium 250 MG Oral Cap       Multiple Vitamins-Minerals (MULTIVITAMIN ADULTS 50+) Oral Tab       aspirin 81 MG Oral Tab EC Take 1 tablet (81 mg total) by mouth daily.      Vitamin D, Cholecalciferol, 50 MCG (2000 UT) Oral Cap Take by mouth.

## 2025-04-30 ENCOUNTER — OFFICE VISIT (OUTPATIENT)
Dept: ENDOCRINOLOGY CLINIC | Facility: CLINIC | Age: 60
End: 2025-04-30
Payer: COMMERCIAL

## 2025-04-30 VITALS
WEIGHT: 193 LBS | DIASTOLIC BLOOD PRESSURE: 87 MMHG | SYSTOLIC BLOOD PRESSURE: 135 MMHG | HEART RATE: 65 BPM | BODY MASS INDEX: 29.25 KG/M2 | HEIGHT: 68 IN

## 2025-04-30 DIAGNOSIS — N52.9 ERECTILE DYSFUNCTION, UNSPECIFIED ERECTILE DYSFUNCTION TYPE: ICD-10-CM

## 2025-04-30 DIAGNOSIS — R79.89 LOW TESTOSTERONE: Primary | ICD-10-CM

## 2025-04-30 DIAGNOSIS — C20 RECTAL CANCER (HCC): ICD-10-CM

## 2025-04-30 DIAGNOSIS — E66.3 OVERWEIGHT (BMI 25.0-29.9): ICD-10-CM

## 2025-04-30 PROCEDURE — 3008F BODY MASS INDEX DOCD: CPT | Performed by: INTERNAL MEDICINE

## 2025-04-30 PROCEDURE — 3075F SYST BP GE 130 - 139MM HG: CPT | Performed by: INTERNAL MEDICINE

## 2025-04-30 PROCEDURE — 3079F DIAST BP 80-89 MM HG: CPT | Performed by: INTERNAL MEDICINE

## 2025-04-30 PROCEDURE — 99214 OFFICE O/P EST MOD 30 MIN: CPT | Performed by: INTERNAL MEDICINE

## 2025-04-30 NOTE — PROGRESS NOTES
Reason for Visit: low testosterone, ED,    Requesting Physician:   Mehnaz Robin DO    CHIEF COMPLAINT:    No chief complaint on file.     HISTORY OF PRESENT ILLNESS:   Cesar Viramontes is a 59 year old male who presents with low testosterone, ED and colorectal ca s/p surgery in 2019    We discussed lab result  Still has low energy, low libido and decreased erection   Stopped etoh   Occasionally uses THC  He noticed decreased morning erection.  Denied RUBEN symptoms  Was not in relationship in long time. Now in relationship. Sexually active. Normal libido when with his partner.    No change in testicle size  Working out regularly x3/wk in the last 2 yrs. Gained muscle.     Wt is stable  Fainted 9/2024 ?possible triggered by marijuana waiting for dinner table to be ready.    Puberty: unremarkable   He has 2  biological children at age 16 and 31 now  . He did not need to seek fertility assistance/treatment.   Steroid: No    Anabolic steroids: No   Previous testosterone or \"supplements\" : No   Head trauma: No   Infection (mumps) or trauma to the testicles: No        01/17/25 08:00 04/04/25 08:10   Sex Horm Bind Glob 19.3 - 76.4 nmol/L 26.9 30.7   Testost % Free+Weak Bnd 9.0 - 46.0 % 26.7 17.1   Testost Free+Weak Bnd 40.0 - 250.0 ng/dL 76.1 46.1   Testosterone Tot LC/.0 - 916.0 ng/dL 285.1 269.6     ASSESSMENT AND PLAN:  Cesar Viramontes is a 59 year old male who presents with  low testosterone, ED, colorectal ca s/p surgery in 2019  We discussed repeated lab levels. Free testosterone levels are normal.  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    Visual impairment    glasses     PAST SURGICAL HISTORY:   Past Surgical History:    Procedure Laterality Date    Chemosurg mohs 1st stage Right 1994    cheek    Colonoscopy  11/2019    clip left from colonoscopy     Colonoscopy N/A 1/16/2024    Procedure: COLONOSCOPY;  Surgeon: Danie Joshi MD;  Location: [facility] ENDOSCOPY    Inguinal hernia repair Right 1994    Inguinal hernia repair Left 09/19/2019    Lap, surg; colectomy, partial, w/anastomosis, w/coloproctostomy  12/10/2019     CURRENT MEDICATIONS:    No outpatient medications have been marked as taking for the 4/30/25 encounter (Office Visit) with Diana Bradley MD.     ALLERGIES:  Allergies[1]    SOCIAL HISTORY:    Social History     Socioeconomic History    Marital status: OTHER    Number of children: 4   Occupational History    Occupation: [employer]     Employer: [employer]   Tobacco Use    Smoking status: Never    Smokeless tobacco: Never   Vaping Use    Vaping status: Never Used   Substance and Sexual Activity    Alcohol use: Yes     Alcohol/week: 2.0 standard drinks of alcohol     Types: 2 Glasses of wine per week     Comment: 1-2 glasses of wine daily    Drug use: Yes     Types: Cannabis     Comment: eddibles once a week   Other Topics Concern    Caffeine Concern Yes     Comment: coffee    Pt has a pacemaker No    Pt has a defibrillator No    Reaction to local anesthetic No   HR   Smoking no  Marijuana occ  Etoh   less often now   Drugs no  FAMILY HISTORY:   Family History   Problem Relation Age of Onset    Prostate Cancer Father     Hypertension Father     Stroke Father     Heart Disease Father     Breast Cancer Mother     Other (Other) Sister         parathryoid dysfunction    Diabetes Neg     Glaucoma Neg     Macular degeneration Neg         PHYSICAL EXAM:   Height: 5' 8\" (172.7 cm) (04/30 0828)  Weight: 193 lb (87.5 kg) (04/30 0828)  BSA (Calculated - sq m): 2.01 sq meters (04/30 0828)  Pulse: 65 (04/30 0828)  BP: 135/87 (04/30 0828)  Temp: --  Do Not Use - Resp Rate: --  SpO2: --     Body mass index is 29.35 kg/m².       DATA:     Pertinent data reviewed   Latest Reference Range & Units 01/17/25 08:00 04/04/25 08:10   Sex Horm Bind Glob 19.3 - 76.4 nmol/L 26.9 30.7   Testost % Free+Weak Bnd 9.0 - 46.0 % 26.7 17.1   Testost Free+Weak Bnd 40.0 - 250.0 ng/dL 76.1 46.1   Testosterone Tot LC/.0 - 916.0 ng/dL 285.1 269.6       Please call pt with work up result   Testost Free+Weak Bnd  40.0 - 250.0 ng/dL 53.7     Free testo is normal   Total testo is low but improved from before   We discussed lifestyle changes   Repeat fasting AM labs before next visit   schedule f/u visit  in 2-3 mo         Latest Reference Range & Units 01/15/24 08:42 09/21/24 09:41   Hemoglobin 13.0 - 17.5 g/dL 17.1 17.6 (H)   Hematocrit 39.0 - 53.0 % 48.5 49.9   (H): Data is abnormally high   Latest Reference Range & Units 09/21/24 09:41 11/07/24 07:50 01/02/25 11:50   Free Testost Direct 7.2 - 24.0 pg/mL 4.7 (L)     IGF I 68 - 247 ng/mL  112    IGF-1, Z Score -2.0 - 2.0 S.D.  -0.7     ID    1,070,618   Sex Horm Bind Glob 19.3 - 76.4 nmol/L  26.7    Testost % Free+Weak Bnd 9.0 - 46.0 %  23.3    Testost Free+Weak Bnd 40.0 - 250.0 ng/dL  53.7    Testosterone Tot LC/.0 - 916.0 ng/dL  230.5 (L)    (L): Data is abnormally low   Latest Reference Range & Units 09/21/24 09:41 11/07/24 07:50   T4,Free (Direct) 0.8 - 1.7 ng/dL  1.0   TSH 0.550 - 4.780 uIU/mL 1.607 1.026   Cortisol ug/dL  19.5   Prolactin 2.1-17.7 ng/mL ng/mL  6.2   Estradiol <=39.8 pg/mL pg/mL  21.1   FSH 1.4-18.1 mIU/mL mIU/mL  2.1   LH 1.5-9.3 mIU/mL mIU/mL  1.3 (L)   TESTOSTERONE 264 - 916 ng/dL 184 (L)    (   11/20/19 15:14   CT   ADRENALS: No mass or enlargement    Rpt: View report in Results Review for more information   Latest Reference Range & Units 09/21/24 09:41   Free Testost Direct 7.2 - 24.0 pg/mL 4.7 (L)   (L): Data is abnormally low   Latest Reference Range & Units 09/21/24 09:41   TSH 0.550 - 4.780 mIU/mL 1.607   TESTOSTERONE 264 - 916  ng/dL 184 (L)   (L): Data is abnormally low   Latest Reference Range & Units 01/15/24 08:42 09/21/24 09:41   Cholesterol, Total <200 mg/dL 158 198   Triglycerides 30 - 149 mg/dL 81 118   HDL Cholesterol 40 - 59 mg/dL 37 (L) 38 (L)   VLDL 0 - 30 mg/dL 14 22   NON-HDL CHOLESTEROL <130 mg/dL 121 160 (H)   LDL Cholesterol Calc <100 mg/dL 105 (H) 139 (H)   PSA Screen <=4.00 ng/mL 0.49 0.71   A/G Ratio 1.0 - 2.0  1.7 1.6   PROTEIN, TOTAL 5.7 - 8.2 g/dL 7.1 7.5   Albumin 3.2 - 4.8 g/dL 4.5 4.6   VITAMIN D, 25-OH, TOTAL 30.0 - 100.0 ng/mL  33.4   (L): Data is abnormally low  (H): Data is abnormally high     No results for input(s): \"TSH\", \"T4F\", \"T3F\", \"THYP\" in the last 72 hours.  No results found.    Orders Placed This Encounter   Procedures    LH (Luteinizing Hormone)    FSH    Testosterone,Total and Weakly Bound w/ SHBG     Orders Placed This Encounter    LH (Luteinizing Hormone)     Standing Status:   Future     Expected Date:   4/30/2025     Expiration Date:   4/30/2026     Release to patient:   Immediate    FSH     Standing Status:   Future     Expected Date:   4/30/2025     Expiration Date:   4/30/2026     Release to patient:   Immediate    Testosterone,Total and Weakly Bound w/ SHBG     Standing Status:   Future     Expected Date:   4/30/2025     Expiration Date:   4/30/2026     Release to patient:   Immediate          This is a specialized patient consultation in endocrinology and required comprehensive review of prior records, as well as current evaluation, with time required for consideration of complex endocrine issues and consultation. For this visit, I personally interviewed the patient, and family member if accompanied, performed the pertinent parts of the history and physical examination. ROS included screening for appropriate endocrine conditions.   Today's diagnosis and plan were reviewed in detail with the patient who states understanding and agrees with plan. I discussed with the patient possible  diagnosis, differential diagnosis, need for work up, treatment options, alternatives and side effects.     Please see note for details about time spent which includes:   · pre-visit preparation  · reviewing records  · face to face time with the patient   · timely documentation of the encounter  · ordering medications/tests  · communication with care team  · care coordination    I appreciate the opportunity to be part of your patient's medical care and will keep you, as the referring and primary physicians, informed about the care of your patient. Please feel free to contact me should you have any questions.    The 21st Century Cures Act makes medical notes like these available to patients in the interest of transparency. Please be advised this is a medical document. Medical documents are intended to carry relevant information, facts as evident, and the clinical opinion of the practitioner. The medical note is intended as peer to peer communication and may appear blunt or direct. It is written in medical language and may contain abbreviations or verbiage that are unfamiliar.   Diana Bradley MD             [1] No Known Allergies

## 2025-04-30 NOTE — PATIENT INSTRUCTIONS
01/17/25 08:00 04/04/25 08:10   Sex Horm Bind Glob 19.3 - 76.4 nmol/L 26.9 30.7   Testost % Free+Weak Bnd 9.0 - 46.0 % 26.7 17.1   Testost Free+Weak Bnd 40.0 - 250.0 ng/dL 76.1 46.1   Testosterone Tot LC/.0 - 916.0 ng/dL 285.1 269.6     Wt Readings from Last 6 Encounters:   04/30/25 193 lb (87.5 kg)   01/24/25 193 lb (87.5 kg)   01/02/25 192 lb (87.1 kg)   10/31/24 192 lb (87.1 kg)   09/06/24 189 lb (85.7 kg)   06/24/24 195 lb 9.6 oz (88.7 kg)     Body mass index is 29.35 kg/m².

## 2025-05-02 ENCOUNTER — TELEPHONE (OUTPATIENT)
Dept: OTOLARYNGOLOGY | Facility: CLINIC | Age: 60
End: 2025-05-02

## 2025-05-02 ENCOUNTER — OFFICE VISIT (OUTPATIENT)
Dept: OTOLARYNGOLOGY | Facility: CLINIC | Age: 60
End: 2025-05-02
Payer: COMMERCIAL

## 2025-05-02 DIAGNOSIS — D17.0 LIPOMA OF NECK: Primary | ICD-10-CM

## 2025-05-02 PROCEDURE — 99203 OFFICE O/P NEW LOW 30 MIN: CPT | Performed by: OTOLARYNGOLOGY

## 2025-05-02 NOTE — PROGRESS NOTES
Cesar Viramontes is a 59 year old male.    Chief Complaint   Patient presents with    Mass     Lipoma on left side of neck        HISTORY OF PRESENT ILLNESS  He presents with significant history of squamous cell carcinoma of the face status post excision and reconstruction as well as colorectal cancer status post surgery and postsurgical treatment.  States that in the last 3 years he has noted a slowly enlarging structure in his posterior neck and he has concerns about possible malignancy.  Seen by Dr. Robin and sent to my office for my opinion regarding this neck mass.  Told that this is most likely a lipoma      Social Hx on file[1]    Family History[2]    Past Medical History[3]    Past Surgical History[4]      REVIEW OF SYSTEMS    System Neg/Pos Details   Constitutional Negative Fatigue, fever and weight loss.   ENMT Negative Drooling.   Eyes Negative Blurred vision and vision changes.   Respiratory Negative Dyspnea and wheezing.   Cardio Negative Chest pain, irregular heartbeat/palpitations and syncope.   GI Negative Abdominal pain and diarrhea.   Endocrine Negative Cold intolerance and heat intolerance.   Neuro Negative Tremors.   Psych Negative Anxiety and depression.   Integumentary Negative Frequent skin infections, pigment change and rash.   Hema/Lymph Negative Easy bleeding and easy bruising.           PHYSICAL EXAM    There were no vitals taken for this visit.       Constitutional Normal Overall appearance - Normal.   Psychiatric Normal Orientation - Oriented to time, place, person & situation. Appropriate mood and affect.   Neck Exam Normal Inspection - Normal. Palpation - Normal. Parotid gland - Normal. Thyroid gland - Normal.   Eyes Normal Conjunctiva - Right: Normal, Left: Normal. Pupil - Right: Normal, Left: Normal. Fundus - Right: Normal, Left: Normal.   Neurological Normal Memory - Normal. Cranial nerves - Cranial nerves II through XII grossly intact.   Head/Face Normal Facial features - Normal.  Eyebrows - Normal. Skull - Normal.        Nasopharynx Normal External nose - Normal. Lips/teeth/gums - Normal. Tonsils - Normal. Oropharynx - Normal.   Ears Normal Inspection - Right: Normal, Left: Normal. Canal - Right: Normal, Left: Normal. TM - Right: Normal, Left: Normal.   Skin Normal Inspection -posterior neck lipoma 3 cm on the left        Lymph Detail Normal Submental. Submandibular. Anterior cervical. Posterior cervical. Supraclavicular.        Nose/Mouth/Throat Normal External nose - Normal. Lips/teeth/gums - Normal. Tonsils - Normal. Oropharynx - Normal.   Nose/Mouth/Throat Normal Nares - Right: Normal Left: Normal. Septum -Normal  Turbinates - Right: Normal, Left: Normal.     Medications - Current[5]  ASSESSMENT AND PLAN    1. Lipoma of neck  I did reassure him that this appears to be a benign mass of the posterior neck most likely consistent with lipoma.  I did recommend excising this lesion as it does seem to be bothering him.  We discussed the risks and benefits he specifically understands the risks of postoperative pain, bleeding as well as poor cosmesis.  He accepts these risks and wishes to proceed        This note was prepared using Dragon Medical voice recognition dictation software. As a result errors may occur. When identified these errors have been corrected. 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[employer], [employer]     Employer: [employer]   Tobacco Use    Smoking status: Never    Smokeless tobacco: Never   Vaping Use    Vaping status: Never Used   Substance and Sexual Activity    Alcohol use: Yes     Alcohol/week: 2.0 standard drinks of alcohol     Types: 2 Glasses of wine per week     Comment: 1-2 glasses of wine daily    Drug use: Yes      Types: Cannabis     Comment: eddibles once a week   Other Topics Concern    Caffeine Concern Yes     Comment: coffee    Pt has a pacemaker No    Pt has a defibrillator No    Reaction to local anesthetic No   [2]   Family History  Problem Relation Age of Onset    Prostate Cancer Father     Hypertension Father     Stroke Father     Heart Disease Father     Breast Cancer Mother     Other (Other) Sister         parathryoid dysfunction    Diabetes Neg     Glaucoma Neg     Macular degeneration Neg    [3]   Past Medical History:   Chalazion of right eye    RUL and RLL, OD    Chalazion right upper eyelid    Rectal cancer (HCC)    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    Visual impairment    glasses   [4]   Past Surgical History:  Procedure Laterality Date    Chemosurg mohs 1st stage Right 1994    cheek    Colonoscopy  11/2019    clip left from colonoscopy     Colonoscopy N/A 1/16/2024    Procedure: COLONOSCOPY;  Surgeon: Danie Joshi MD;  Location: [facility] ENDOSCOPY    Inguinal hernia repair Right 1994    Inguinal hernia repair Left 09/19/2019    Lap, surg; colectomy, partial, w/anastomosis, w/coloproctostomy  12/10/2019   [5]   Current Outpatient Medications:     L-Arginine 500 MG Oral Cap, , Disp: , Rfl:     Magnesium 250 MG Oral Cap, , Disp: , Rfl:     Multiple Vitamins-Minerals (MULTIVITAMIN ADULTS 50+) Oral Tab, , Disp: , Rfl:     aspirin 81 MG Oral Tab EC, Take 1 tablet (81 mg total) by mouth daily., Disp: , Rfl:     Vitamin D, Cholecalciferol, 50 MCG (2000 UT) Oral Cap, Take by mouth., Disp: , Rfl:

## 2025-06-04 PROBLEM — D17.0 LIPOMA OF NECK: Status: ACTIVE | Noted: 2025-06-04

## 2025-06-04 PROBLEM — D17.0 LIPOMA OF NECK: Status: RESOLVED | Noted: 2025-06-04 | Resolved: 2025-06-04

## 2025-06-04 PROCEDURE — 88304 TISSUE EXAM BY PATHOLOGIST: CPT | Performed by: OTOLARYNGOLOGY

## 2025-06-05 ENCOUNTER — TELEPHONE (OUTPATIENT)
Dept: OTOLARYNGOLOGY | Facility: CLINIC | Age: 60
End: 2025-06-05

## 2025-06-05 NOTE — TELEPHONE ENCOUNTER
Per pt doing well, incision is dry and intact. Pt taking antibiotic as prescribed. Will contact our office if symptoms change or worsen such bleeding or drainage from incision, increased redness or swelling or fever (temp great than 101). Pt verbalized understanding.

## 2025-06-12 ENCOUNTER — OFFICE VISIT (OUTPATIENT)
Dept: OTOLARYNGOLOGY | Facility: CLINIC | Age: 60
End: 2025-06-12
Payer: COMMERCIAL

## 2025-06-12 DIAGNOSIS — D17.0 LIPOMA OF NECK: Primary | ICD-10-CM

## 2025-06-12 PROCEDURE — 99024 POSTOP FOLLOW-UP VISIT: CPT | Performed by: OTOLARYNGOLOGY

## 2025-06-12 NOTE — PROGRESS NOTES
Cesar Viramontes is a 60 year old male.    Chief Complaint   Patient presents with    Post-Op     Patient is here for post of of lipoma of the neck.       HISTORY OF PRESENT ILLNESS  He presents with significant history of squamous cell carcinoma of the face status post excision and reconstruction as well as colorectal cancer status post surgery and postsurgical treatment.  States that in the last 3 years he has noted a slowly enlarging structure in his posterior neck and he has concerns about possible malignancy.  Seen by Dr. Robin and sent to my office for my opinion regarding this neck mass.  Told that this is most likely a lipoma      6/4/25 Large posterior neck lipoma. Here for excision and closure of this lipoma. Risks and benefits discussed and he wishes to proceed.    6/12/25 8 days out from excision of a large posterior neck lipoma on the left.  Doing very well no complaints or concerns here to have his sutures removed.  Path reviewed with patient.    Social Hx on file[1]    Family History[2]    Past Medical History[3]    Past Surgical History[4]      REVIEW OF SYSTEMS    System Neg/Pos Details   Constitutional Negative Fatigue, fever and weight loss.   ENMT Negative Drooling.   Eyes Negative Blurred vision and vision changes.   Respiratory Negative Dyspnea and wheezing.   Cardio Negative Chest pain, irregular heartbeat/palpitations and syncope.   GI Negative Abdominal pain and diarrhea.   Endocrine Negative Cold intolerance and heat intolerance.   Neuro Negative Tremors.   Psych Negative Anxiety and depression.   Integumentary Negative Frequent skin infections, pigment change and rash.   Hema/Lymph Negative Easy bleeding and easy bruising.           PHYSICAL EXAM    There were no vitals taken for this visit.       Constitutional Normal Overall appearance - Normal.   Psychiatric Normal Orientation - Oriented to time, place, person & situation. Appropriate mood and affect.   Neck Exam Normal Inspection -  Normal. Palpation - Normal. Parotid gland - Normal. Thyroid gland - Normal.   Eyes Normal Conjunctiva - Right: Normal, Left: Normal. Pupil - Right: Normal, Left: Normal. Fundus - Right: Normal, Left: Normal.   Neurological Normal Memory - Normal. Cranial nerves - Cranial nerves II through XII grossly intact.   Head/Face Normal Facial features - Normal. Eyebrows - Normal. Skull - Normal.        Nasopharynx Normal External nose - Normal. Lips/teeth/gums - Normal. Tonsils - Normal. Oropharynx - Normal.   Ears Normal Inspection - Right: Normal, Left: Normal. Canal - Right: Normal, Left: Normal. TM - Right: Normal, Left: Normal.   Skin Normal Inspection - Normal.        Lymph Detail Normal Submental. Submandibular. Anterior cervical. Posterior cervical. Supraclavicular.   Incision  Clean dry and intact   Nose/Mouth/Throat Normal External nose - Normal. Lips/teeth/gums - Normal. Tonsils - Normal. Oropharynx - Normal.   Nose/Mouth/Throat Normal Nares - Right: Normal Left: Normal. Septum -Normal  Turbinates - Right: Normal, Left: Normal.     Medications - Current[5]  ASSESSMENT AND PLAN    1. Lipoma of neck  Large lipoma of neck removed healing very nicely wound care discussed understood Path reviewed.  Return to see me as needed        This note was prepared using Dragon Medical voice recognition dictation software. As a result errors may occur. When identified these errors have been corrected. While every attempt is made to correct errors during dictation discrepancies may still exist    Cesar Marroquin MD    6/12/2025    8:34 AM         [1]   Social History  Socioeconomic History    Marital status: OTHER    Number of children: 4   Occupational History    Occupation: [employer], [employer]     Employer: [employer]   Tobacco Use    Smoking status: Never    Smokeless tobacco: Never   Vaping Use    Vaping status: Never Used   Substance and Sexual Activity    Alcohol use: Yes     Alcohol/week: 2.0 standard drinks  of alcohol     Types: 2 Glasses of wine per week     Comment: 1-2 glasses of wine daily    Drug use: Yes     Types: Cannabis     Comment: edibles once a week   Other Topics Concern    Caffeine Concern Yes     Comment: coffee    Pt has a pacemaker No    Pt has a defibrillator No    Reaction to local anesthetic No   [2]   Family History  Problem Relation Age of Onset    Prostate Cancer Father     Hypertension Father     Stroke Father     Heart Disease Father     Breast Cancer Mother     Other (Other) Sister         parathryoid dysfunction    Diabetes Neg     Glaucoma Neg     Macular degeneration Neg    [3]   Past Medical History:   Chalazion of right eye    RUL and RLL, OD    Chalazion right upper eyelid    Rectal cancer (HCC)    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 ENDOSCOPY    Inguinal hernia repair Right 1994    Inguinal hernia repair Left 09/19/2019    Lap, surg; colectomy, partial, w/anastomosis, w/coloproctostomy  12/10/2019   [5]   Current Outpatient Medications:     HYDROcodone-acetaminophen 5-325 MG Oral Tab, Take 1 tablet by mouth every 8 (eight) hours as needed., Disp: 10 tablet, Rfl: 0    cephALEXin 500 MG Oral Cap, Take 1 capsule (500 mg total) by mouth every 8 (eight) hours., Disp: 21 capsule, Rfl: 0    L-Arginine 500 MG Oral Cap, , Disp: , Rfl:     Magnesium 250 MG Oral Cap, , Disp: , Rfl:     Multiple Vitamins-Minerals (MULTIVITAMIN ADULTS 50+) Oral Tab, , Disp: , Rfl:     aspirin 81 MG Oral Tab EC, Take 81 mg by mouth in the morning., Disp: , Rfl:     Vitamin D, Cholecalciferol, 50 MCG (2000 UT) Oral Cap, Take by mouth., Disp: , Rfl:

## 2025-06-24 ENCOUNTER — OFFICE VISIT (OUTPATIENT)
Age: 60
End: 2025-06-24
Attending: INTERNAL MEDICINE
Payer: COMMERCIAL

## 2025-06-24 VITALS
BODY MASS INDEX: 29 KG/M2 | TEMPERATURE: 97 F | SYSTOLIC BLOOD PRESSURE: 143 MMHG | HEART RATE: 58 BPM | RESPIRATION RATE: 16 BRPM | OXYGEN SATURATION: 95 % | WEIGHT: 193 LBS | DIASTOLIC BLOOD PRESSURE: 92 MMHG

## 2025-06-24 DIAGNOSIS — Z85.048 PERSONAL HISTORY OF RECTAL CANCER: Primary | ICD-10-CM

## 2025-06-24 DIAGNOSIS — Z85.048 ENCOUNTER FOR FOLLOW-UP SURVEILLANCE OF RECTAL CANCER: ICD-10-CM

## 2025-06-24 DIAGNOSIS — Z08 ENCOUNTER FOR FOLLOW-UP SURVEILLANCE OF RECTAL CANCER: ICD-10-CM

## 2025-06-24 NOTE — PROGRESS NOTES
HPI     Cesar Viramontes is a 60 year old male who is here today for follow up of   Encounter Diagnoses   Name Primary?    Personal history of rectal cancer Yes    Encounter for follow-up surveillance of rectal cancer    .    Patient status post hand-assisted laparoscopic rectosigmoid resection, (low anterior resection with low pelvic anastomosis), on 12/10/2019.    Patient had colonoscopy on 1/13/2021 and had 2 adenomatous polyps removed.  He has been referred for follow-up in 3 years.  Patient had repeat colonoscopy on 1/16/2024, patient was recommended for repeat colonoscopy in 3 years, this was negative.    Feeling well.      Patient is active, working.      Weight stable    BMs regular, daily.  No blood in the stool.     Had lipoma removed from the back of then neck with improved ROM.      ECOG PS  0       Review of Systems:       Review of Systems   Constitutional:  Negative for appetite change, fatigue and unexpected weight change.   HENT:           Seasonal allergies.   Respiratory:  Negative for cough and shortness of breath.    Cardiovascular:  Negative for chest pain.   Gastrointestinal:  Negative for abdominal pain, blood in stool, constipation and diarrhea.   Genitourinary:  Negative for dysuria and frequency.    Musculoskeletal:  Negative for back pain and neck pain.   Neurological:  Negative for dizziness and headaches.   Hematological:  Negative for adenopathy.   Psychiatric/Behavioral:  Negative for sleep disturbance.          Current Outpatient Medications   Medication Sig Dispense Refill    L-Arginine 500 MG Oral Cap       Magnesium 250 MG Oral Cap       Multiple Vitamins-Minerals (MULTIVITAMIN ADULTS 50+) Oral Tab       aspirin 81 MG Oral Tab EC Take 1 tablet (81 mg total) by mouth in the morning.      Vitamin D, Cholecalciferol, 50 MCG (2000 UT) Oral Cap Take by mouth.       Allergies:   No Known Allergies    Past Medical History:    Chalazion of right eye    RUL and RLL, OD    Chalazion right  upper eyelid    Rectal cancer (HCC)    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    Visual impairment    glasses     Past Surgical History:   Procedure Laterality Date    Chemosurg mohs 1st stage Right 1994    cheek    Colonoscopy  11/2019    clip left from colonoscopy     Colonoscopy N/A 1/16/2024    Procedure: COLONOSCOPY;  Surgeon: Danie Joshi MD;  Location: [facility] ENDOSCOPY    Inguinal hernia repair Right 1994    Inguinal hernia repair Left 09/19/2019    Lap, surg; colectomy, partial, w/anastomosis, w/coloproctostomy  12/10/2019     Social History     Socioeconomic History    Marital status: OTHER    Number of children: 4   Occupational History    Occupation: [employer]     Employer: [employer]   Tobacco Use    Smoking status: Never    Smokeless tobacco: Never   Vaping Use    Vaping status: Never Used   Substance and Sexual Activity    Alcohol use: Yes     Alcohol/week: 2.0 standard drinks of alcohol     Types: 2 Glasses of wine per week     Comment: 1-2 glasses of wine daily    Drug use: Yes     Types: Cannabis     Comment: edibles once a week   Other Topics Concern    Caffeine Concern Yes     Comment: coffee    Pt has a pacemaker No    Pt has a defibrillator No    Reaction to local anesthetic No         Family History   Problem Relation Age of Onset    Prostate Cancer Father     Hypertension Father     Stroke Father     Heart Disease Father     Breast Cancer Mother     Other (Other) Sister         parathryoid dysfunction    Diabetes Neg     Glaucoma Neg     Macular degeneration Neg          PHYSICAL EXAM:    BP (!) 143/92 (BP Location: Left arm, Patient Position: Sitting, Cuff Size: large)   Pulse 58   Temp 97 °F (36.1 °C)   Resp 16   Wt 87.5 kg (193 lb)   SpO2 95%   BMI 29.35 kg/m²   Wt Readings from Last 6 Encounters:   06/24/25 87.5 kg (193 lb)   05/29/25 86.2 kg (190 lb)   04/30/25 87.5 kg (193 lb)   01/24/25 87.5 kg (193 lb)   01/02/25 87.1 kg (192 lb)    10/31/24 87.1 kg (192 lb)        Physical Exam  General: Patient is alert, not in acute distress.  HEENT: EOMs intact. PERRL.   Neck: No JVD. No palpable lymphadenopathy. Neck is supple.    Chest: Clear to auscultation.  Heart: Regular rate and rhythm.   Abdomen: Soft, non tender with good bowel sounds.  Extremities: No edema.  Neurological: Grossly intact.   Lymphatics: There is no palpable lymphadenopathy throughout in the cervical, supraclavicular, axillary, or inguinal regions.  Psych/Depression: nl          ASSESSMENT/PLAN:     Encounter Diagnoses   Name Primary?    Personal history of rectal cancer Yes    Encounter for follow-up surveillance of rectal cancer        Cancer Staging  Rectal cancer (HCC)  Staging form: Colon and Rectum, AJCC 8th Edition  - Clinical: Stage I (cT1, cN0, cM0) - Signed by Sulaiman Fraser MD on 12/23/2019  - Pathologic: pT0, pN0, cM0 - Signed by Sulaiman Fraser MD on 12/23/2019      D/w patient the pathologic staging and that outcomes are excellent.  Will have pathology review polyp to look for muscle in sample.     No further treatment recommended.      Surveillance:  1 yr post op colonoscopy.  If advanced adenomatous polyp repeat in 1 yr, if none, repeat in 3 yrs then every 5 yrs.      Colonoscopy due in January 2027, as most recent colonoscopy January 2024 was [facility]..      No need for CEA.  No further imaging unless symptomatic.    Continue with baby ASA and vitamin D.    F/u in 12 months, yearly.      All questions answered to the best of my abilities.  Support provided to the patient.      Encouraged to call if issues arise.       Quality measures:    Hypertension target range 130-140/70-80  Per PCP      MDM low      No orders of the defined types were placed in this encounter.      Results From Past 48 Hours:  No results found for this or any previous visit (from the past 48 hours).    Imaging & Referrals:  None   No orders of the defined types were placed in this encounter.

## (undated) DEVICE — PAD ALC 43.5X24IN PREP  LF

## (undated) DEVICE — SOL  .9 1000ML BTL

## (undated) DEVICE — GAUZE SPONGES: Brand: DEROYAL

## (undated) DEVICE — [HIGH FLOW INSUFFLATOR,  DO NOT USE IF PACKAGE IS DAMAGED,  KEEP DRY,  KEEP AWAY FROM SUNLIGHT,  PROTECT FROM HEAT AND RADIOACTIVE SOURCES.]: Brand: PNEUMOSURE

## (undated) DEVICE — DRAPE,UNDRBUT,WHT GRAD PCH,CAPPORT,20/CS: Brand: MEDLINE

## (undated) DEVICE — SUTURE SILK 3-0 C017D

## (undated) DEVICE — ENDOPATH ECHELON ENDOSCOPIC LINEAR CUTTER RELOADS, GREEN, 60MM: Brand: ECHELON ENDOPATH

## (undated) DEVICE — ENCORE® LATEX ACCLAIM SIZE 8, STERILE LATEX POWDER-FREE SURGICAL GLOVE: Brand: ENCORE

## (undated) DEVICE — DRAPE SHEET LAPCHOLE 124X100X7

## (undated) DEVICE — LEGGINGS SRG 48X31IN CUF STRL

## (undated) DEVICE — SOLUTION ENDOSCOPIC ANTI-FOG NON-TOXIC NON-ABRASIVE 6 CUBIC CENTIMETER WITH RADIOPAQUE ADHESIVE-BACKED SPONGE STERILE NOT MADE WITH NATURAL RUBBER LATEX MEDICHOICE: Brand: MEDICHOICE

## (undated) DEVICE — ENDOPATH ECHELON ENDOSCOPIC LINEAR CUTTER RELOADS, WHITE, 60MM: Brand: ECHELON ENDOPATH

## (undated) DEVICE — STERILE LATEX POWDER-FREE SURGICAL GLOVESWITH NITRILE COATING: Brand: PROTEXIS

## (undated) DEVICE — 3M™ STERI-STRIP™ REINFORCED ADHESIVE SKIN CLOSURES, R1547, 1/2 IN X 4 IN (12 MM X 100 MM), 6 STRIPS/ENVELOPE: Brand: 3M™ STERI-STRIP™

## (undated) DEVICE — MEDI-VAC NON-CONDUCTIVE SUCTION TUBING 6MM X 1.8M (6FT.) L: Brand: CARDINAL HEALTH

## (undated) DEVICE — DISPOSABLE SUCTION/IRRIGATOR TUBE SET: Brand: AHTO

## (undated) DEVICE — KIT CLEAN ENDOKIT 1.1OZ GOWNX2

## (undated) DEVICE — GOWN SURG AERO BLUE PERF LG

## (undated) DEVICE — SUTURE VICRYL 0 J906G

## (undated) DEVICE — INSUFFLATION NEEDLE TO ESTABLISH PNEUMOPERITONEUM.: Brand: INSUFFLATION NEEDLE

## (undated) DEVICE — Device: Brand: DUAL NARE NASAL CANNULAE FEMALE LUER CON 7FT O2 TUBE

## (undated) DEVICE — SUTURE VICRYL 2-0 SH

## (undated) DEVICE — A P RESECTION: Brand: MEDLINE INDUSTRIES, INC.

## (undated) DEVICE — VIOLET BRAIDED (POLYGLACTIN 910), SYNTHETIC ABSORBABLE SUTURE: Brand: COATED VICRYL

## (undated) DEVICE — ECHELON FLEX POWERED PLUS ARTICULATING ENDOSCOPIC LINEAR CUTTER , 60MM: Brand: ECHELON FLEX

## (undated) DEVICE — STERILE SURGICAL LUBRICANT, METAL TUBE: Brand: SURGILUBE

## (undated) DEVICE — SUTURE SILK 2-0 SA85H

## (undated) DEVICE — 60 ML SYRINGE REGULAR TIP: Brand: MONOJECT

## (undated) DEVICE — UNDYED BRAIDED (POLYGLACTIN 910), SYNTHETIC ABSORBABLE SUTURE: Brand: COATED VICRYL

## (undated) DEVICE — HARMONIC ACE +7 LAPAROSCOPIC SHEARS ADVANCED HEMOSTASIS 5MM DIAMETER 45CM SHAFT LENGTH  FOR USE WITH GRAY HAND PIECE ONLY: Brand: HARMONIC ACE

## (undated) DEVICE — 9534HP TRANSPARENT DRSG W/FRAME: Brand: 3M™ TEGADERM™

## (undated) DEVICE — Device: Brand: DISPOSABLE BULB & BLADDER

## (undated) DEVICE — KIT ENDO ORCAPOD 160/180/190

## (undated) DEVICE — ENCORE® LATEX MICRO SIZE 8, STERILE LATEX POWDER-FREE SURGICAL GLOVE: Brand: ENCORE

## (undated) DEVICE — TROCAR: Brand: KII® SLEEVE

## (undated) DEVICE — TROCAR: Brand: KII FIOS FIRST ENTRY

## (undated) DEVICE — SUTURE PDS II 0 Z991G

## (undated) DEVICE — SUTURE PROLENE 2-0 SH

## (undated) DEVICE — ACCESS PLATFORM FOR MINIMALLY INVASIVE SURGERY.: Brand: GELPORT® LAPAROSCOPIC  SYSTEM

## (undated) DEVICE — MEDI-VAC NON-CONDUCTIVE SUCTION TUBING: Brand: CARDINAL HEALTH

## (undated) DEVICE — 3M™ STERI-STRIP™ COMPOUND BENZOIN TINCTURE 40 BAGS/CARTON 4 CARTONS/CASE C1544: Brand: 3M™ STERI-STRIP™

## (undated) DEVICE — STANDARD HYPODERMIC NEEDLE,POLYPROPYLENE HUB: Brand: MONOJECT

## (undated) DEVICE — PDS II VLT 0 107CM AG ST3: Brand: ENDOLOOP

## (undated) DEVICE — PLUMEPORT ACTIV LAPAROSCOPIC SMOKE FILTRATION DEVICE: Brand: PLUMEPORT ACTIVE

## (undated) DEVICE — 3M™ TEGADERM™ TRANSPARENT FILM DRESSING, 1626W, 4 IN X 4-3/4 IN (10 CM X 12 CM), 50 EACH/CARTON, 4 CARTON/CASE: Brand: 3M™ TEGADERM™

## (undated) DEVICE — SIGMOID SURGICAL SUCTION INSTRUMENT: Brand: ARGYLE

## (undated) DEVICE — SOL LACT RINGERS 1000ML

## (undated) DEVICE — STAPLER CIRCULAR ENDO 29MM

## (undated) NOTE — LETTER
12/24/2019          To Whom It May Concern:    Heber Washington is currently under my medical care and may not return to work at this time. He may return to work on January 2, 2020. Activity is restricted as follows:  No heavy lifting over 10 pounds or stre

## (undated) NOTE — LETTER
11/19/19        Kim Nash  [address]  [address] [address]      Dear Amarjit Hernandez records indicate that you have outstanding lab work and or testing that was ordered for you and has not yet been completed:        TSH Heidy Marianos

## (undated) NOTE — LETTER
10/12/2019              Gladys Buck        [address]        [place] [address]         Dear Lamar Aschoff,    It has come to my attention that you missed your last appointment with me.   As you know, regular medical attention is essential to ma

## (undated) NOTE — LETTER
[facility]  Administration of Anesthesia  ICesar agree to be cared for by a physician anesthesiologist alone and/or with a nurse anesthetist, who is specially trained to monitor me and give me medicine to put me to sleep or keep me comfortable during my procedure    I understand that my anesthesiologist and/or anesthetist is not an employee or agent of [facility] or [employer]. He or she works for [facility]    As the patient asking for anesthesia services, I agree to:  Allow the anesthesiologist (anesthesia doctor) to give me medicine and do additional procedures as necessary. Some examples are: Starting or using an “IV” to give me medicine, fluids or blood during my procedure, and having a breathing tube placed to help me breathe when I’m asleep (intubation). In the event that my heart stops working properly, I understand that my anesthesiologist will make every effort to sustain my life, unless otherwise directed by [facility] Do Not Resuscitate documents.  Tell my anesthesia doctor before my procedure:  If I am pregnant.  The last time that I ate or drank.  iii. All of the medicines I take (including prescriptions, herbal supplements, and pills I can buy without a prescription (including street drugs/illegal medications). Failure to inform my anesthesiologist about these medicines may increase my risk of anesthetic complications.  iv.If I am allergic to anything or have had a reaction to anesthesia before.  I understand how the anesthesia medicine will help me (benefits).  I understand that with any type of anesthesia medicine there are risks:  The most common risks are: nausea, vomiting, sore throat, muscle soreness, damage to my eyes, mouth, or teeth (from breathing tube placement).  Rare risks include: remembering what happened during my procedure, allergic reactions to medications, injury to my airway, heart, lungs, vision, nerves, or muscles  and in extremely rare instances death.  My doctor has explained to me other choices available to me for my care (alternatives).  Pregnant Patients (“epidural”):  I understand that the risks of having an epidural (medicine given into my back to help control pain during labor), include itching, low blood pressure, difficulty urinating, headache or slowing of the baby’s heart. Very rare risks include infection, bleeding, seizure, irregular heart rhythms and nerve injury.  Regional Anesthesia (“spinal”, “epidural”, & “nerve blocks”):  I understand that rare but potential complications include headache, bleeding, infection, seizure, irregular heart rhythms, and nerve injury.    _____________________________________________________________________________  Patient (or Representative) Signature/Relationship to Patient  Date   Time    _____________________________________________________________________________   Name (if used)    Language/Organization   Time    _____________________________________________________________________________  Nurse Anesthetist Signature     Date   Time  _____________________________________________________________________________  Anesthesiologist Signature     Date   Time  I have discussed the procedure and information above with the patient (or patient’s representative) and answered their questions. The patient or their representative has agreed to have anesthesia services.    _____________________________________________________________________________  Witness        Date   Time  I have verified that the signature is that of the patient or patient’s representative, and that it was signed before the procedure  Patient Name: Cesar Viramontes     : 5/15/1965                 Printed: 2024 at 3:07 PM    Medical Record #: [id number]                                            Page 1 of 1  ----------ANESTHESIA CONSENT----------

## (undated) NOTE — LETTER
9/24/2019          To Whom It May Concern:    Tram Guevara is currently under my medical care and may return to work on September 25, 2019. Activity is restricted as follows:  No lifting over ten pounds, no strenuous activity until October 31, 2019, then

## (undated) NOTE — Clinical Note
FYI: TCM outreach completed. Scheduled TCM/HFU for 12-17-19. Pt asking if he may receive the pneumonia vaccine or at least discuss when he should have it.

## (undated) NOTE — LETTER
[facility]  [address], [place], [place]  Authorization for Surgical Operation and Procedure                                                                                           I hereby authorize Danie Joshi MD, my physician and his/her assistants (if applicable), which may include medical students, residents, and/or fellows, to perform the following surgical operation/ procedure and administer such anesthesia as may be determined necessary by my physician: Operation/Procedure name (s) COLONOSCOPY on Cesar Viramontes   2.   I recognize that during the surgical operation/procedure, unforeseen conditions may necessitate additional or different procedures than those listed above.  I, therefore, further authorize and request that the above-named surgeon, assistants, or designees perform such procedures as are, in their judgment, necessary and desirable.    3.   My surgeon/physician has discussed prior to my surgery the potential benefits, risks and side effects of this procedure; the likelihood of achieving goals; and potential problems that might occur during recuperation.  They also discussed reasonable alternatives to the procedure, including risks, benefits, and side effects related to the alternatives and risks related to not receiving this procedure.  I have had all my questions answered and I acknowledge that no guarantee has been made as to the result that may be obtained.    4.   Should the need arise during my operation/procedure, which includes change of level of care prior to discharge, I also consent to the administration of blood and/or blood products.  Further, I understand that despite careful testing and screening of blood or blood products by collecting agencies, I may still be subject to ill effects as a result of receiving a blood transfusion and/or blood products.  The following are some, but not all, of the potential risks that can occur: fever and allergic  reactions, hemolytic reactions, transmission of diseases such as Hepatitis, AIDS and Cytomegalovirus (CMV) and fluid overload.  In the event that I wish to have an autologous transfusion of my own blood, or a directed donor transfusion, I will discuss this with my physician.  Check only if Refusing Blood or Blood Products  I understand refusal of blood or blood products as deemed necessary by my physician may have serious consequences to my condition to include possible death. I hereby assume responsibility for my refusal and release the hospital, its personnel, and my physicians from any responsibility for the consequences of my refusal.    o  Refuse   5.   I authorize the use of any specimen, organs, tissues, body parts or foreign objects that may be removed from my body during the operation/procedure for diagnosis, research or teaching purposes and their subsequent disposal by hospital authorities.  I also authorize the release of specimen test results and/or written reports to my treating physician on the hospital medical staff or other referring or consulting physicians involved in my care, at the discretion of the Pathologist or my treating physician.    6.   I consent to the photographing or videotaping of the operations or procedures to be performed, including appropriate portions of my body for medical, scientific, or educational purposes, provided my identity is not revealed by the pictures or by descriptive texts accompanying them.  If the procedure has been photographed/videotaped, the surgeon will obtain the original picture, image, videotape or CD.  The hospital will not be responsible for storage, release or maintenance of the picture, image, tape or CD.    7.   I consent to the presence of a  or observers in the operating room as deemed necessary by my physician or their designees.    8.   I recognize that in the event my procedure results in extended X-Ray/fluoroscopy time, I may  develop a skin reaction.    9. If I have a Do Not Attempt Resuscitation (DNAR) order in place, that status will be suspended while in the operating room, procedural suite, and during the recovery period unless otherwise explicitly stated by me (or a person authorized to consent on my behalf). The surgeon or my attending physician will determine when the applicable recovery period ends for purposes of reinstating the DNAR order.  10. Patients having a sterilization procedure: I understand that if the procedure is successful the results will be permanent and it will therefore be impossible for me to inseminate, conceive, or bear children.  I also understand that the procedure is intended to result in sterility, although the result has not been guaranteed.   11. I acknowledge that my physician has explained sedation/analgesia administration to me including the risk and benefits I consent to the administration of sedation/analgesia as may be necessary or desirable in the judgment of my physician.    I CERTIFY THAT I HAVE READ AND FULLY UNDERSTAND THE ABOVE CONSENT TO OPERATION and/or OTHER PROCEDURE.     _________________________________________ _________________________________     ___________________________________  Signature of Patient     Signature of Responsible Person                   Printed Name of Responsible Person                              _________________________________________ ______________________________        ___________________________________  Signature of Witness         Date  Time         Relationship to Patient    STATEMENT OF PHYSICIAN My signature below affirms that prior to the time of the procedure; I have explained to the patient and/or his/her legal representative, the risks and benefits involved in the proposed treatment and any reasonable alternative to the proposed treatment. I have also explained the risks and benefits involved in refusal of the proposed treatment and alternatives  to the proposed treatment and have answered the patient's questions. If I have a significant financial interest in a co-management agreement or a significant financial interest in any product or implant, or other significant relationship used in this procedure/surgery, I have disclosed this and had a discussion with my patient.     _______________________________________________________________ _____________________________  (Signature of Physician)                                                                                         (Date)                                   (Time)  Patient Name: Cesar Viramontes    : 5/15/1965   Printed: 2024      Medical Record #: [id number]                                              Page 1 of 1

## (undated) NOTE — LETTER
Shawn Mccann Md  [address] [place] [id number]       05/02/25        Patient: Cesar Viramontes   YOB: 1965   Date of Visit: 5/2/2025       Dear  Dr. Lobito DO,      Thank you for referring Cesar Viramontes to my practice.  Please find my assessment and plan below.        ASSESSMENT AND PLAN    1. Lipoma of neck  I did reassure him that this appears to be a benign mass of the posterior neck most likely consistent with lipoma.  I did recommend excising this lesion as it does seem to be bothering him.  We discussed the risks and benefits he specifically understands the risks of postoperative pain, bleeding as well as poor cosmesis.  He accepts these risks and wishes to proceed             Sincerely,   Cesar Marroquin MD   [facility] [address] [address]    Document electronically generated by:  Cesar Marroquin MD

## (undated) NOTE — MR AVS SNAPSHOT
[facility] - [place] [address] [place]  [address]  [phone]               Thank you for choosing us for your health care visit with Liv Rand DO.   We are glad to serve you and happy to provide you with this summary of discharge instructions in [employer] by going to Visits < Admission Summaries. If you've been to the Emergency Department or your doctor's office, you can view your past visit information in [employer] by going to Visits < Visit Summaries. [employer] questions?

## (undated) NOTE — ED AVS SNAPSHOT
[facility]   MRN: [id number]    Department:  [facility] Emergency Department   Date of Visit:  12/15/2017           Disclosure     Insurance plans vary and the physician(s) referred by the ER may not be covered by your plan.  Please contact yo CARE PHYSICIAN AT ONCE OR RETURN IMMEDIATELY TO THE EMERGENCY DEPARTMENT. If you have been prescribed any medication(s), please fill your prescription right away and begin taking the medication(s) as directed.   If you believe that any of the medications

## (undated) NOTE — LETTER
No referring provider defined for this encounter. 11/27/19        Patient: Ashwini Cartagena   YOB: 1965   Date of Visit: 11/27/2019       Dear  Dr. Cristóbal Oconnor,      Thank you for referring Ashwini Cartagena to my practice.   Please find my

## (undated) NOTE — MR AVS SNAPSHOT
[facility] - [place] [address] [address] [address] [phone]               Thank you for choosing us for your health care visit with Karrie Granados DO.   We are glad to serve you and happy to provide you with this summary of

## (undated) NOTE — LETTER
No referring provider defined for this encounter. 12/23/19        Patient: Karolina Saenz   YOB: 1965   Date of Visit: 12/23/2019       Dear  Dr. Camelia Brantley MD,      Thank you for referring Karolina Saenz to my practice.   Please find my